# Patient Record
Sex: MALE | Race: WHITE | Employment: OTHER | URBAN - METROPOLITAN AREA
[De-identification: names, ages, dates, MRNs, and addresses within clinical notes are randomized per-mention and may not be internally consistent; named-entity substitution may affect disease eponyms.]

---

## 2017-02-06 ENCOUNTER — TELEPHONE (OUTPATIENT)
Dept: INTERNAL MEDICINE CLINIC | Age: 82
End: 2017-02-06

## 2017-02-06 RX ORDER — ALPRAZOLAM 0.5 MG/1
0.5 TABLET ORAL NIGHTLY PRN
Qty: 30 TABLET | Refills: 0 | Status: SHIPPED | OUTPATIENT
Start: 2017-02-06 | End: 2017-03-08

## 2017-03-06 ENCOUNTER — TELEPHONE (OUTPATIENT)
Dept: INTERNAL MEDICINE CLINIC | Age: 82
End: 2017-03-06

## 2017-03-06 ENCOUNTER — OFFICE VISIT (OUTPATIENT)
Dept: INTERNAL MEDICINE CLINIC | Age: 82
End: 2017-03-06

## 2017-03-06 VITALS
TEMPERATURE: 98.1 F | OXYGEN SATURATION: 97 % | HEIGHT: 69 IN | WEIGHT: 151 LBS | BODY MASS INDEX: 22.36 KG/M2 | DIASTOLIC BLOOD PRESSURE: 60 MMHG | HEART RATE: 100 BPM | SYSTOLIC BLOOD PRESSURE: 110 MMHG

## 2017-03-06 DIAGNOSIS — L30.9 DERMATITIS: ICD-10-CM

## 2017-03-06 DIAGNOSIS — I10 ESSENTIAL HYPERTENSION: Primary | ICD-10-CM

## 2017-03-06 DIAGNOSIS — F41.1 ANXIETY STATE: ICD-10-CM

## 2017-03-06 DIAGNOSIS — E11.9 TYPE 2 DIABETES MELLITUS WITHOUT COMPLICATION, WITHOUT LONG-TERM CURRENT USE OF INSULIN (HCC): ICD-10-CM

## 2017-03-06 LAB
A/G RATIO: 1.7 (ref 1.1–2.2)
ALBUMIN SERPL-MCNC: 4.3 G/DL (ref 3.4–5)
ALP BLD-CCNC: 68 U/L (ref 40–129)
ALT SERPL-CCNC: 8 U/L (ref 10–40)
ANION GAP SERPL CALCULATED.3IONS-SCNC: 16 MMOL/L (ref 3–16)
AST SERPL-CCNC: 17 U/L (ref 15–37)
BILIRUB SERPL-MCNC: 0.6 MG/DL (ref 0–1)
BUN BLDV-MCNC: 23 MG/DL (ref 7–20)
CALCIUM SERPL-MCNC: 9.7 MG/DL (ref 8.3–10.6)
CHLORIDE BLD-SCNC: 106 MMOL/L (ref 99–110)
CO2: 22 MMOL/L (ref 21–32)
CREAT SERPL-MCNC: 1.3 MG/DL (ref 0.8–1.3)
GFR AFRICAN AMERICAN: >60
GFR NON-AFRICAN AMERICAN: 52
GLOBULIN: 2.5 G/DL
GLUCOSE BLD-MCNC: 114 MG/DL (ref 70–99)
POTASSIUM SERPL-SCNC: 4.6 MMOL/L (ref 3.5–5.1)
SODIUM BLD-SCNC: 144 MMOL/L (ref 136–145)
TOTAL PROTEIN: 6.8 G/DL (ref 6.4–8.2)

## 2017-03-06 PROCEDURE — 99214 OFFICE O/P EST MOD 30 MIN: CPT | Performed by: INTERNAL MEDICINE

## 2017-03-06 RX ORDER — KETOCONAZOLE 20 MG/G
CREAM TOPICAL
Qty: 30 G | Refills: 1 | Status: SHIPPED | OUTPATIENT
Start: 2017-03-06 | End: 2017-04-20 | Stop reason: SDUPTHER

## 2017-03-06 ASSESSMENT — ENCOUNTER SYMPTOMS
RESPIRATORY NEGATIVE: 1
BACK PAIN: 1

## 2017-03-07 LAB
ESTIMATED AVERAGE GLUCOSE: 108.3 MG/DL
HBA1C MFR BLD: 5.4 %

## 2017-03-08 ENCOUNTER — TELEPHONE (OUTPATIENT)
Dept: INTERNAL MEDICINE CLINIC | Age: 82
End: 2017-03-08

## 2017-03-10 ENCOUNTER — TELEPHONE (OUTPATIENT)
Dept: INTERNAL MEDICINE CLINIC | Age: 82
End: 2017-03-10

## 2017-03-27 ENCOUNTER — TELEPHONE (OUTPATIENT)
Dept: INTERNAL MEDICINE CLINIC | Age: 82
End: 2017-03-27

## 2017-03-27 DIAGNOSIS — I48.91 ATRIAL FIBRILLATION, UNSPECIFIED TYPE (HCC): ICD-10-CM

## 2017-03-27 DIAGNOSIS — R21 RASH: Primary | ICD-10-CM

## 2017-04-12 ENCOUNTER — TELEPHONE (OUTPATIENT)
Dept: INTERNAL MEDICINE CLINIC | Age: 82
End: 2017-04-12

## 2017-04-20 ENCOUNTER — OFFICE VISIT (OUTPATIENT)
Dept: INTERNAL MEDICINE CLINIC | Age: 82
End: 2017-04-20

## 2017-04-20 VITALS
DIASTOLIC BLOOD PRESSURE: 80 MMHG | SYSTOLIC BLOOD PRESSURE: 120 MMHG | HEART RATE: 82 BPM | OXYGEN SATURATION: 97 % | HEIGHT: 69 IN | BODY MASS INDEX: 21.62 KG/M2 | WEIGHT: 146 LBS

## 2017-04-20 DIAGNOSIS — M48.061 SPINAL STENOSIS, LUMBAR REGION, WITHOUT NEUROGENIC CLAUDICATION: ICD-10-CM

## 2017-04-20 DIAGNOSIS — G25.0 BENIGN ESSENTIAL TREMOR: ICD-10-CM

## 2017-04-20 DIAGNOSIS — I25.10 ATHEROSCLEROSIS OF CORONARY ARTERY OF NATIVE HEART WITHOUT ANGINA PECTORIS, UNSPECIFIED VESSEL OR LESION TYPE: Primary | ICD-10-CM

## 2017-04-20 DIAGNOSIS — L30.9 DERMATITIS: ICD-10-CM

## 2017-04-20 DIAGNOSIS — E11.9 TYPE 2 DIABETES MELLITUS WITHOUT COMPLICATION, WITHOUT LONG-TERM CURRENT USE OF INSULIN (HCC): ICD-10-CM

## 2017-04-20 PROCEDURE — G0444 DEPRESSION SCREEN ANNUAL: HCPCS | Performed by: INTERNAL MEDICINE

## 2017-04-20 PROCEDURE — 99214 OFFICE O/P EST MOD 30 MIN: CPT | Performed by: INTERNAL MEDICINE

## 2017-04-20 RX ORDER — ALPRAZOLAM 1 MG/1
1 TABLET ORAL NIGHTLY PRN
COMMUNITY
End: 2017-04-20 | Stop reason: SDUPTHER

## 2017-04-20 RX ORDER — KETOCONAZOLE 20 MG/G
CREAM TOPICAL
Qty: 30 G | Refills: 1 | Status: SHIPPED | OUTPATIENT
Start: 2017-04-20 | End: 2017-11-16 | Stop reason: CLARIF

## 2017-04-20 RX ORDER — ALPRAZOLAM 1 MG/1
1 TABLET ORAL NIGHTLY PRN
Qty: 30 TABLET | Refills: 0 | Status: SHIPPED | OUTPATIENT
Start: 2017-04-20 | End: 2017-06-09 | Stop reason: SDUPTHER

## 2017-04-20 RX ORDER — TRAMADOL HYDROCHLORIDE 50 MG/1
50 TABLET ORAL EVERY 8 HOURS PRN
Qty: 30 TABLET | Refills: 0 | Status: SHIPPED | OUTPATIENT
Start: 2017-04-20 | End: 2017-04-30

## 2017-04-20 ASSESSMENT — ENCOUNTER SYMPTOMS
EYES NEGATIVE: 1
RESPIRATORY NEGATIVE: 1
BACK PAIN: 1

## 2017-04-20 ASSESSMENT — PATIENT HEALTH QUESTIONNAIRE - PHQ9
10. IF YOU CHECKED OFF ANY PROBLEMS, HOW DIFFICULT HAVE THESE PROBLEMS MADE IT FOR YOU TO DO YOUR WORK, TAKE CARE OF THINGS AT HOME, OR GET ALONG WITH OTHER PEOPLE: 1
7. TROUBLE CONCENTRATING ON THINGS, SUCH AS READING THE NEWSPAPER OR WATCHING TELEVISION: 0
3. TROUBLE FALLING OR STAYING ASLEEP: 0
5. POOR APPETITE OR OVEREATING: 0
4. FEELING TIRED OR HAVING LITTLE ENERGY: 3
8. MOVING OR SPEAKING SO SLOWLY THAT OTHER PEOPLE COULD HAVE NOTICED. OR THE OPPOSITE, BEING SO FIGETY OR RESTLESS THAT YOU HAVE BEEN MOVING AROUND A LOT MORE THAN USUAL: 3
9. THOUGHTS THAT YOU WOULD BE BETTER OFF DEAD, OR OF HURTING YOURSELF: 0
2. FEELING DOWN, DEPRESSED OR HOPELESS: 2
6. FEELING BAD ABOUT YOURSELF - OR THAT YOU ARE A FAILURE OR HAVE LET YOURSELF OR YOUR FAMILY DOWN: 3
1. LITTLE INTEREST OR PLEASURE IN DOING THINGS: 0
SUM OF ALL RESPONSES TO PHQ QUESTIONS 1-9: 11
SUM OF ALL RESPONSES TO PHQ9 QUESTIONS 1 & 2: 2

## 2017-05-05 ENCOUNTER — OFFICE VISIT (OUTPATIENT)
Dept: INTERNAL MEDICINE CLINIC | Age: 82
End: 2017-05-05

## 2017-05-05 VITALS
DIASTOLIC BLOOD PRESSURE: 70 MMHG | SYSTOLIC BLOOD PRESSURE: 130 MMHG | WEIGHT: 148.8 LBS | HEART RATE: 68 BPM | OXYGEN SATURATION: 98 % | HEIGHT: 69 IN | BODY MASS INDEX: 22.04 KG/M2

## 2017-05-05 DIAGNOSIS — L98.9 SKIN LESION OF LEFT ARM: Primary | ICD-10-CM

## 2017-05-05 DIAGNOSIS — E78.2 MIXED HYPERLIPIDEMIA: Chronic | ICD-10-CM

## 2017-05-05 PROCEDURE — 99213 OFFICE O/P EST LOW 20 MIN: CPT | Performed by: INTERNAL MEDICINE

## 2017-05-05 ASSESSMENT — ENCOUNTER SYMPTOMS
EYES NEGATIVE: 1
RESPIRATORY NEGATIVE: 1

## 2017-05-16 ENCOUNTER — TELEPHONE (OUTPATIENT)
Dept: INTERNAL MEDICINE CLINIC | Age: 82
End: 2017-05-16

## 2017-05-18 ENCOUNTER — OFFICE VISIT (OUTPATIENT)
Dept: SURGERY | Age: 82
End: 2017-05-18

## 2017-05-18 VITALS
WEIGHT: 148 LBS | DIASTOLIC BLOOD PRESSURE: 74 MMHG | BODY MASS INDEX: 21.92 KG/M2 | HEIGHT: 69 IN | SYSTOLIC BLOOD PRESSURE: 122 MMHG

## 2017-05-18 DIAGNOSIS — L98.9 ARM SKIN LESION, LEFT: Primary | ICD-10-CM

## 2017-05-18 PROCEDURE — 11100 PR BIOPSY OF SKIN LESION: CPT | Performed by: SURGERY

## 2017-05-18 ASSESSMENT — ENCOUNTER SYMPTOMS
COLOR CHANGE: 1
GASTROINTESTINAL NEGATIVE: 1
RESPIRATORY NEGATIVE: 1

## 2017-05-22 ENCOUNTER — TELEPHONE (OUTPATIENT)
Dept: SURGERY | Age: 82
End: 2017-05-22

## 2017-06-01 ENCOUNTER — OFFICE VISIT (OUTPATIENT)
Dept: SURGERY | Age: 82
End: 2017-06-01

## 2017-06-01 VITALS
WEIGHT: 149 LBS | DIASTOLIC BLOOD PRESSURE: 52 MMHG | SYSTOLIC BLOOD PRESSURE: 104 MMHG | BODY MASS INDEX: 22.07 KG/M2 | HEIGHT: 69 IN

## 2017-06-01 DIAGNOSIS — L98.9 ARM SKIN LESION, LEFT: Primary | ICD-10-CM

## 2017-06-01 PROCEDURE — 99024 POSTOP FOLLOW-UP VISIT: CPT | Performed by: SURGERY

## 2017-06-12 ENCOUNTER — TELEPHONE (OUTPATIENT)
Dept: INTERNAL MEDICINE CLINIC | Age: 82
End: 2017-06-12

## 2017-06-28 DIAGNOSIS — M48.061 SPINAL STENOSIS, LUMBAR REGION, WITHOUT NEUROGENIC CLAUDICATION: ICD-10-CM

## 2017-06-28 RX ORDER — ALPRAZOLAM 1 MG/1
TABLET ORAL
Qty: 30 TABLET | Refills: 0 | Status: SHIPPED | OUTPATIENT
Start: 2017-06-28 | End: 2017-08-14 | Stop reason: SDUPTHER

## 2017-08-14 DIAGNOSIS — M48.061 SPINAL STENOSIS, LUMBAR REGION, WITHOUT NEUROGENIC CLAUDICATION: ICD-10-CM

## 2017-08-14 RX ORDER — ALPRAZOLAM 1 MG/1
TABLET ORAL
Qty: 30 TABLET | Refills: 0 | Status: SHIPPED | OUTPATIENT
Start: 2017-08-14 | End: 2017-09-12 | Stop reason: SDUPTHER

## 2017-09-18 ENCOUNTER — IMMUNIZATION (OUTPATIENT)
Dept: INTERNAL MEDICINE CLINIC | Age: 82
End: 2017-09-18

## 2017-09-18 DIAGNOSIS — Z23 NEED FOR INFLUENZA VACCINATION: Primary | ICD-10-CM

## 2017-09-18 PROCEDURE — G0008 ADMIN INFLUENZA VIRUS VAC: HCPCS | Performed by: INTERNAL MEDICINE

## 2017-09-18 PROCEDURE — 90662 IIV NO PRSV INCREASED AG IM: CPT | Performed by: INTERNAL MEDICINE

## 2017-11-15 RX ORDER — POTASSIUM CHLORIDE 750 MG/1
TABLET, FILM COATED, EXTENDED RELEASE ORAL
Qty: 120 TABLET | Refills: 2 | Status: SHIPPED | OUTPATIENT
Start: 2017-11-15 | End: 2018-01-08 | Stop reason: SDUPTHER

## 2017-11-16 ENCOUNTER — OFFICE VISIT (OUTPATIENT)
Dept: INTERNAL MEDICINE CLINIC | Age: 82
End: 2017-11-16

## 2017-11-16 VITALS
BODY MASS INDEX: 22.87 KG/M2 | WEIGHT: 154.4 LBS | DIASTOLIC BLOOD PRESSURE: 82 MMHG | HEART RATE: 103 BPM | SYSTOLIC BLOOD PRESSURE: 138 MMHG | OXYGEN SATURATION: 98 % | HEIGHT: 69 IN

## 2017-11-16 DIAGNOSIS — M48.061 SPINAL STENOSIS, LUMBAR REGION, WITHOUT NEUROGENIC CLAUDICATION: ICD-10-CM

## 2017-11-16 DIAGNOSIS — G89.4 CHRONIC PAIN SYNDROME: ICD-10-CM

## 2017-11-16 DIAGNOSIS — E11.9 TYPE 2 DIABETES MELLITUS WITHOUT COMPLICATION, WITHOUT LONG-TERM CURRENT USE OF INSULIN (HCC): ICD-10-CM

## 2017-11-16 DIAGNOSIS — I10 ESSENTIAL HYPERTENSION: Primary | ICD-10-CM

## 2017-11-16 DIAGNOSIS — E78.2 MIXED HYPERLIPIDEMIA: Chronic | ICD-10-CM

## 2017-11-16 PROBLEM — L98.9 ARM SKIN LESION, LEFT: Status: RESOLVED | Noted: 2017-05-18 | Resolved: 2017-11-16

## 2017-11-16 LAB
ANION GAP SERPL CALCULATED.3IONS-SCNC: 16 MMOL/L (ref 3–16)
BUN BLDV-MCNC: 24 MG/DL (ref 7–20)
CALCIUM SERPL-MCNC: 9.9 MG/DL (ref 8.3–10.6)
CHLORIDE BLD-SCNC: 102 MMOL/L (ref 99–110)
CO2: 22 MMOL/L (ref 21–32)
CREAT SERPL-MCNC: 1.1 MG/DL (ref 0.8–1.3)
GFR AFRICAN AMERICAN: >60
GFR NON-AFRICAN AMERICAN: >60
GLUCOSE BLD-MCNC: 106 MG/DL (ref 70–99)
POTASSIUM SERPL-SCNC: 4.6 MMOL/L (ref 3.5–5.1)
SODIUM BLD-SCNC: 140 MMOL/L (ref 136–145)

## 2017-11-16 PROCEDURE — 99214 OFFICE O/P EST MOD 30 MIN: CPT | Performed by: INTERNAL MEDICINE

## 2017-11-16 RX ORDER — CLONIDINE HYDROCHLORIDE 0.1 MG/1
0.1 TABLET ORAL 2 TIMES DAILY PRN
Qty: 30 TABLET | Refills: 0 | Status: SHIPPED | OUTPATIENT
Start: 2017-11-16 | End: 2017-12-15 | Stop reason: SDUPTHER

## 2017-11-16 RX ORDER — CLONIDINE HYDROCHLORIDE 0.1 MG/1
0.1 TABLET ORAL 2 TIMES DAILY PRN
COMMUNITY
End: 2017-11-16 | Stop reason: SDUPTHER

## 2017-11-16 ASSESSMENT — ENCOUNTER SYMPTOMS
BACK PAIN: 1
RESPIRATORY NEGATIVE: 1
EYES NEGATIVE: 1

## 2017-11-16 NOTE — PROGRESS NOTES
Subjective:      Patient ID: Enedelia Cash is a 80 y.o. male. Here for a f/u   Here to review  Htn. Has  No related complaints and is compliant with  Meds. There is no dyspnea, chest pain or palpitations, edema, headache  Or dizzyness. Tolerating  medication and we review all meds and pertinant labs. Still has back pain       HPI    Review of Systems   Constitutional: Negative. HENT: Negative. Eyes: Negative. Respiratory: Negative. Cardiovascular: Negative. Endocrine: Negative. Genitourinary: Negative. Musculoskeletal: Positive for back pain. Neurological: Negative. Objective:   Physical Exam   Constitutional: He is oriented to person, place, and time. He appears well-developed and well-nourished. HENT:   Head: Normocephalic and atraumatic. Eyes: EOM are normal. Pupils are equal, round, and reactive to light. Cardiovascular: Normal rate, regular rhythm and normal heart sounds. Pulmonary/Chest: Breath sounds normal. No respiratory distress. He has no wheezes. He has no rales. Abdominal: Bowel sounds are normal.   Musculoskeletal: He exhibits no edema. Neurological: He is alert and oriented to person, place, and time. He has normal reflexes. Assessment:      Lawrence Camacho was seen today for follow-up. Diagnoses and all orders for this visit:    Essential hypertension    Use clonidine prn  BP >150/90  Chronic pain syndrome     referral  Mixed hyperlipidemia   same  Spinal stenosis, lumbar region, without neurogenic claudication        Type 2 diabetes mellitus without complication, without long-term current use of insulin ([address]     Check labs          Plan: Ming Napoles

## 2017-11-17 ENCOUNTER — TELEPHONE (OUTPATIENT)
Dept: INTERNAL MEDICINE CLINIC | Age: 82
End: 2017-11-17

## 2017-11-17 LAB
ESTIMATED AVERAGE GLUCOSE: 105.4 MG/DL
HBA1C MFR BLD: 5.3 %

## 2017-11-27 DIAGNOSIS — M48.061 SPINAL STENOSIS, LUMBAR REGION, WITHOUT NEUROGENIC CLAUDICATION: ICD-10-CM

## 2017-11-27 RX ORDER — ALPRAZOLAM 1 MG/1
TABLET ORAL
Qty: 30 TABLET | Refills: 0 | OUTPATIENT
Start: 2017-11-27

## 2017-12-15 DIAGNOSIS — I10 ESSENTIAL HYPERTENSION: ICD-10-CM

## 2017-12-15 DIAGNOSIS — M48.061 SPINAL STENOSIS, LUMBAR REGION, WITHOUT NEUROGENIC CLAUDICATION: ICD-10-CM

## 2017-12-15 RX ORDER — CLONIDINE HYDROCHLORIDE 0.1 MG/1
TABLET ORAL
Qty: 30 TABLET | Refills: 1 | Status: SHIPPED | OUTPATIENT
Start: 2017-12-15 | End: 2018-01-08 | Stop reason: SDUPTHER

## 2017-12-15 RX ORDER — ALPRAZOLAM 1 MG/1
TABLET ORAL
Qty: 30 TABLET | Refills: 1 | Status: SHIPPED | OUTPATIENT
Start: 2017-12-15 | End: 2018-01-08 | Stop reason: SDUPTHER

## 2018-01-08 DIAGNOSIS — M48.061 SPINAL STENOSIS, LUMBAR REGION, WITHOUT NEUROGENIC CLAUDICATION: ICD-10-CM

## 2018-01-08 RX ORDER — POTASSIUM CHLORIDE 750 MG/1
TABLET, FILM COATED, EXTENDED RELEASE ORAL
Qty: 120 TABLET | Refills: 2 | Status: SHIPPED | OUTPATIENT
Start: 2018-01-08 | End: 2018-01-08 | Stop reason: SDUPTHER

## 2018-01-08 RX ORDER — ALPRAZOLAM 1 MG/1
TABLET ORAL
Qty: 30 TABLET | Refills: 1 | Status: SHIPPED | OUTPATIENT
Start: 2018-01-08 | End: 2018-03-20 | Stop reason: SDUPTHER

## 2018-01-08 RX ORDER — ALPRAZOLAM 1 MG/1
TABLET ORAL
Qty: 30 TABLET | Refills: 1 | Status: SHIPPED | OUTPATIENT
Start: 2018-01-08 | End: 2018-01-08 | Stop reason: SDUPTHER

## 2018-01-08 RX ORDER — CLONIDINE HYDROCHLORIDE 0.1 MG/1
TABLET ORAL
Qty: 180 TABLET | Refills: 1 | Status: SHIPPED | OUTPATIENT
Start: 2018-01-08 | End: 2018-01-09 | Stop reason: SDUPTHER

## 2018-01-08 RX ORDER — POTASSIUM CHLORIDE 750 MG/1
TABLET, FILM COATED, EXTENDED RELEASE ORAL
Qty: 120 TABLET | Refills: 2 | Status: SHIPPED | OUTPATIENT
Start: 2018-01-08 | End: 2018-01-09 | Stop reason: SDUPTHER

## 2018-01-08 NOTE — TELEPHONE ENCOUNTER
Pt also needs a refill for Clonidine HCL 0.1 mg tab takes one tablet by mouth twice a day, pt is now using [employer]     cloNIDine (CATAPRES) 0.1 MG tablet TAKE ONE TABLET BY MOUTH TWICE A DAY AS NEEDED FOR HIGH BLOOD PRESSURE

## 2018-01-08 NOTE — TELEPHONE ENCOUNTER
Patient's wife is calling to say his medication has to go to [address] the number to call is [phone]. Lindsay Reyez . Lindsay Arroyo ...

## 2018-01-08 NOTE — TELEPHONE ENCOUNTER
ALPRAZolam (XANAX) 1 MG tablet TAKE ONE TABLET BY MOUTH EVERY NIGHT AT BEDTIME AS NEEDED FOR SLEEP     potassium chloride (KLOR-CON) 10 MEQ extended release tablet TAKE TWO TABLETS BY MOUTH TWICE A DAY     Patient has new insurance [employer] and needs his prescriptions called to [phone].

## 2018-01-09 DIAGNOSIS — I10 ESSENTIAL HYPERTENSION: ICD-10-CM

## 2018-01-09 RX ORDER — CLONIDINE HYDROCHLORIDE 0.1 MG/1
TABLET ORAL
Qty: 180 TABLET | Refills: 1 | Status: SHIPPED | OUTPATIENT
Start: 2018-01-09 | End: 2018-05-07 | Stop reason: SDUPTHER

## 2018-01-09 RX ORDER — POTASSIUM CHLORIDE 750 MG/1
TABLET, FILM COATED, EXTENDED RELEASE ORAL
Qty: 120 TABLET | Refills: 2 | Status: SHIPPED | OUTPATIENT
Start: 2018-01-09 | End: 2018-03-19 | Stop reason: SDUPTHER

## 2018-01-11 DIAGNOSIS — I10 ESSENTIAL HYPERTENSION: ICD-10-CM

## 2018-01-16 ENCOUNTER — TELEPHONE (OUTPATIENT)
Dept: INTERNAL MEDICINE CLINIC | Age: 83
End: 2018-01-16

## 2018-01-17 ENCOUNTER — TELEPHONE (OUTPATIENT)
Dept: INTERNAL MEDICINE CLINIC | Age: 83
End: 2018-01-17

## 2018-01-17 RX ORDER — ISOPROPYL ALCOHOL 0.75 G/1
SWAB TOPICAL
Qty: 100 EACH | Refills: 2 | Status: SHIPPED | OUTPATIENT
Start: 2018-01-17 | End: 2018-01-22 | Stop reason: SDUPTHER

## 2018-01-17 RX ORDER — ISOPROPYL ALCOHOL 0.75 G/1
SWAB TOPICAL
Qty: 100 EACH | Refills: 2 | Status: SHIPPED | OUTPATIENT
Start: 2018-01-17 | End: 2018-01-17 | Stop reason: SDUPTHER

## 2018-01-22 RX ORDER — ISOPROPYL ALCOHOL 0.75 G/1
SWAB TOPICAL
Qty: 180 EACH | Refills: 2 | Status: SHIPPED | OUTPATIENT
Start: 2018-01-22 | End: 2021-01-01 | Stop reason: CLARIF

## 2018-01-24 RX ORDER — TAMSULOSIN HYDROCHLORIDE 0.4 MG/1
0.4 CAPSULE ORAL DAILY
Qty: 90 CAPSULE | Refills: 3 | Status: SHIPPED | OUTPATIENT
Start: 2018-01-24 | End: 2018-01-26 | Stop reason: SDUPTHER

## 2018-01-26 RX ORDER — TAMSULOSIN HYDROCHLORIDE 0.4 MG/1
0.4 CAPSULE ORAL DAILY
Qty: 90 CAPSULE | Refills: 3 | Status: SHIPPED | OUTPATIENT
Start: 2018-01-26 | End: 2018-02-07 | Stop reason: SDUPTHER

## 2018-02-07 RX ORDER — TAMSULOSIN HYDROCHLORIDE 0.4 MG/1
0.4 CAPSULE ORAL DAILY
Qty: 90 CAPSULE | Refills: 3 | Status: SHIPPED | OUTPATIENT
Start: 2018-02-07 | End: 2018-12-17 | Stop reason: SDUPTHER

## 2018-03-20 DIAGNOSIS — M48.061 SPINAL STENOSIS, LUMBAR REGION, WITHOUT NEUROGENIC CLAUDICATION: ICD-10-CM

## 2018-03-20 RX ORDER — ALPRAZOLAM 1 MG/1
TABLET ORAL
Qty: 30 TABLET | Refills: 0 | Status: SHIPPED | OUTPATIENT
Start: 2018-03-20 | End: 2018-04-20

## 2018-03-20 RX ORDER — POTASSIUM CHLORIDE 750 MG/1
TABLET, FILM COATED, EXTENDED RELEASE ORAL
Qty: 360 TABLET | Refills: 2 | Status: ON HOLD | OUTPATIENT
Start: 2018-03-20 | End: 2019-01-13 | Stop reason: HOSPADM

## 2018-03-23 ENCOUNTER — TELEPHONE (OUTPATIENT)
Dept: INTERNAL MEDICINE CLINIC | Age: 83
End: 2018-03-23

## 2018-03-23 DIAGNOSIS — R53.1 GENERALIZED WEAKNESS: ICD-10-CM

## 2018-03-23 DIAGNOSIS — R53.83 FATIGUE, UNSPECIFIED TYPE: ICD-10-CM

## 2018-03-23 DIAGNOSIS — R29.898 RIGHT LEG WEAKNESS: Primary | ICD-10-CM

## 2018-03-26 ENCOUNTER — TELEPHONE (OUTPATIENT)
Dept: INTERNAL MEDICINE CLINIC | Age: 83
End: 2018-03-26

## 2018-03-26 DIAGNOSIS — F03.90 DEMENTIA WITHOUT BEHAVIORAL DISTURBANCE, UNSPECIFIED DEMENTIA TYPE: Primary | ICD-10-CM

## 2018-03-28 ENCOUNTER — TELEPHONE (OUTPATIENT)
Dept: INTERNAL MEDICINE CLINIC | Age: 83
End: 2018-03-28

## 2018-03-28 NOTE — TELEPHONE ENCOUNTER
Ann Hollis with [facility] calling for verbal confirmation for home Physical Therapy     3 times a week for 4 weeks for strength, transfers and gait. Patient is very weak, can't get out out of bed without help.         Call back [phone]

## 2018-04-06 ENCOUNTER — TELEPHONE (OUTPATIENT)
Dept: INTERNAL MEDICINE CLINIC | Age: 83
End: 2018-04-06

## 2018-04-06 PROBLEM — R55 SYNCOPE AND COLLAPSE: Status: ACTIVE | Noted: 2018-04-06

## 2018-04-11 ENCOUNTER — TELEPHONE (OUTPATIENT)
Dept: INTERNAL MEDICINE CLINIC | Age: 83
End: 2018-04-11

## 2018-04-25 ENCOUNTER — TELEPHONE (OUTPATIENT)
Dept: INTERNAL MEDICINE CLINIC | Age: 83
End: 2018-04-25

## 2018-05-03 ENCOUNTER — TELEPHONE (OUTPATIENT)
Dept: INTERNAL MEDICINE CLINIC | Age: 83
End: 2018-05-03
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
## 2018-05-03 DIAGNOSIS — M48.00 SPINAL STENOSIS, UNSPECIFIED SPINAL REGION: ICD-10-CM

## 2018-05-03 DIAGNOSIS — F09 OBS (ORGANIC BRAIN SYNDROME): Primary | ICD-10-CM

## 2018-05-03 DIAGNOSIS — R27.0 ATAXIA: ICD-10-CM

## 2018-05-07 DIAGNOSIS — I10 ESSENTIAL HYPERTENSION: ICD-10-CM

## 2018-05-07 RX ORDER — CLONIDINE HYDROCHLORIDE 0.1 MG/1
TABLET ORAL
Qty: 180 TABLET | Refills: 0 | Status: SHIPPED | OUTPATIENT
Start: 2018-05-07 | End: 2018-07-30 | Stop reason: SDUPTHER

## 2018-05-16 ENCOUNTER — TELEPHONE (OUTPATIENT)
Dept: INTERNAL MEDICINE CLINIC | Age: 83
End: 2018-05-16

## 2018-05-16 RX ORDER — AMLODIPINE BESYLATE 5 MG/1
5 TABLET ORAL DAILY
Qty: 90 TABLET | Refills: 0 | Status: SHIPPED | OUTPATIENT
Start: 2018-05-16 | End: 2018-09-17 | Stop reason: SDUPTHER

## 2018-06-05 RX ORDER — LANCETS
EACH MISCELLANEOUS
Qty: 200 EACH | Refills: 3 | Status: SHIPPED | OUTPATIENT
Start: 2018-06-05 | End: 2019-02-15 | Stop reason: CLARIF

## 2018-07-30 DIAGNOSIS — I10 ESSENTIAL HYPERTENSION: ICD-10-CM

## 2018-07-30 RX ORDER — CLONIDINE HYDROCHLORIDE 0.1 MG/1
TABLET ORAL
Qty: 180 TABLET | Refills: 0 | Status: SHIPPED | OUTPATIENT
Start: 2018-07-30 | End: 2018-10-01 | Stop reason: SDUPTHER

## 2018-09-18 RX ORDER — AMLODIPINE BESYLATE 5 MG/1
TABLET ORAL
Qty: 90 TABLET | Refills: 0 | Status: SHIPPED | OUTPATIENT
Start: 2018-09-18 | End: 2018-11-20 | Stop reason: SDUPTHER

## 2018-10-01 DIAGNOSIS — I10 ESSENTIAL HYPERTENSION: ICD-10-CM

## 2018-10-02 RX ORDER — CLONIDINE HYDROCHLORIDE 0.1 MG/1
TABLET ORAL
Qty: 180 TABLET | Refills: 0 | Status: SHIPPED | OUTPATIENT
Start: 2018-10-02 | End: 2018-12-05 | Stop reason: SDUPTHER

## 2018-10-16 RX ORDER — BLOOD SUGAR DIAGNOSTIC
STRIP MISCELLANEOUS
Qty: 100 STRIP | Refills: 3 | Status: SHIPPED | OUTPATIENT
Start: 2018-10-16 | End: 2019-02-15 | Stop reason: CLARIF

## 2018-12-05 DIAGNOSIS — I10 ESSENTIAL HYPERTENSION: ICD-10-CM

## 2018-12-05 RX ORDER — CLONIDINE HYDROCHLORIDE 0.1 MG/1
TABLET ORAL
Qty: 180 TABLET | Refills: 0 | Status: ON HOLD | OUTPATIENT
Start: 2018-12-05 | End: 2019-01-13 | Stop reason: HOSPADM

## 2018-12-18 RX ORDER — TAMSULOSIN HYDROCHLORIDE 0.4 MG/1
CAPSULE ORAL
Qty: 90 CAPSULE | Refills: 0 | Status: ON HOLD | OUTPATIENT
Start: 2018-12-18 | End: 2019-01-13 | Stop reason: HOSPADM

## 2019-01-11 ENCOUNTER — APPOINTMENT (OUTPATIENT)
Dept: CT IMAGING | Age: 84
DRG: 312 | End: 2019-01-11
Payer: MEDICARE

## 2019-01-11 ENCOUNTER — HOSPITAL ENCOUNTER (INPATIENT)
Age: 84
LOS: 2 days | Discharge: HOME HEALTH | DRG: 312 | End: 2019-01-13
Attending: EMERGENCY MEDICINE | Admitting: INTERNAL MEDICINE
Payer: MEDICARE

## 2019-01-11 ENCOUNTER — APPOINTMENT (OUTPATIENT)
Dept: GENERAL RADIOLOGY | Age: 84
DRG: 312 | End: 2019-01-11
Payer: MEDICARE

## 2019-01-11 DIAGNOSIS — R53.1 GENERAL WEAKNESS: ICD-10-CM

## 2019-01-11 DIAGNOSIS — R53.83 FATIGUE, UNSPECIFIED TYPE: ICD-10-CM
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
## 2019-01-11 DIAGNOSIS — R00.1 SYMPTOMATIC BRADYCARDIA: Primary | ICD-10-CM

## 2019-01-11 LAB
A/G RATIO: 1.9 (ref 1.1–2.2)
ALBUMIN SERPL-MCNC: 4.1 G/DL (ref 3.4–5)
ALP BLD-CCNC: 67 U/L (ref 40–129)
ALT SERPL-CCNC: 9 U/L (ref 10–40)
ANION GAP SERPL CALCULATED.3IONS-SCNC: 12 MMOL/L (ref 3–16)
AST SERPL-CCNC: 15 U/L (ref 15–37)
BASOPHILS ABSOLUTE: 0 K/UL (ref 0–0.2)
BASOPHILS RELATIVE PERCENT: 0.3 %
BILIRUB SERPL-MCNC: 0.5 MG/DL (ref 0–1)
BILIRUBIN URINE: NEGATIVE
BLOOD, URINE: NEGATIVE
BUN BLDV-MCNC: 21 MG/DL (ref 7–20)
CALCIUM SERPL-MCNC: 9.6 MG/DL (ref 8.3–10.6)
CHLORIDE BLD-SCNC: 103 MMOL/L (ref 99–110)
CLARITY: CLEAR
CO2: 24 MMOL/L (ref 21–32)
COLOR: YELLOW
CREAT SERPL-MCNC: 1.1 MG/DL (ref 0.8–1.3)
EKG ATRIAL RATE: 51 BPM
EKG DIAGNOSIS: NORMAL
EKG P AXIS: 86 DEGREES
EKG P-R INTERVAL: 268 MS
EKG Q-T INTERVAL: 428 MS
EKG QRS DURATION: 80 MS
EKG QTC CALCULATION (BAZETT): 394 MS
EKG R AXIS: 12 DEGREES
EKG T AXIS: 76 DEGREES
EKG VENTRICULAR RATE: 51 BPM
EOSINOPHILS ABSOLUTE: 0.1 K/UL (ref 0–0.6)
EOSINOPHILS RELATIVE PERCENT: 1.6 %
GFR AFRICAN AMERICAN: >60
GFR NON-AFRICAN AMERICAN: >60
GLOBULIN: 2.2 G/DL
GLUCOSE BLD-MCNC: 103 MG/DL (ref 70–99)
GLUCOSE BLD-MCNC: 153 MG/DL (ref 70–99)
GLUCOSE BLD-MCNC: 163 MG/DL (ref 70–99)
GLUCOSE URINE: NEGATIVE MG/DL
HCT VFR BLD CALC: 38.8 % (ref 40.5–52.5)
HEMOGLOBIN: 13.3 G/DL (ref 13.5–17.5)
KETONES, URINE: NEGATIVE MG/DL
LACTIC ACID: 1.3 MMOL/L (ref 0.4–2)
LACTIC ACID: 2.1 MMOL/L (ref 0.4–2)
LEUKOCYTE ESTERASE, URINE: NEGATIVE
LYMPHOCYTES ABSOLUTE: 0.9 K/UL (ref 1–5.1)
LYMPHOCYTES RELATIVE PERCENT: 16.5 %
MCH RBC QN AUTO: 32.9 PG (ref 26–34)
MCHC RBC AUTO-ENTMCNC: 34.4 G/DL (ref 31–36)
MCV RBC AUTO: 95.7 FL (ref 80–100)
MICROSCOPIC EXAMINATION: NORMAL
MONOCYTES ABSOLUTE: 0.2 K/UL (ref 0–1.3)
MONOCYTES RELATIVE PERCENT: 3.4 %
NEUTROPHILS ABSOLUTE: 4.4 K/UL (ref 1.7–7.7)
NEUTROPHILS RELATIVE PERCENT: 78.2 %
NITRITE, URINE: NEGATIVE
PDW BLD-RTO: 13.5 % (ref 12.4–15.4)
PERFORMED ON: ABNORMAL
PERFORMED ON: ABNORMAL
PH UA: 6.5
PLATELET # BLD: 124 K/UL (ref 135–450)
PMV BLD AUTO: 8.3 FL (ref 5–10.5)
POTASSIUM SERPL-SCNC: 4.7 MMOL/L (ref 3.5–5.1)
PRO-BNP: 865 PG/ML (ref 0–449)
PROTEIN UA: NEGATIVE MG/DL
RBC # BLD: 4.06 M/UL (ref 4.2–5.9)
SODIUM BLD-SCNC: 139 MMOL/L (ref 136–145)
SPECIFIC GRAVITY UA: 1.01
TOTAL CK: 49 U/L (ref 39–308)
TOTAL PROTEIN: 6.3 G/DL (ref 6.4–8.2)
TROPONIN: <0.01 NG/ML
TROPONIN: <0.01 NG/ML
URINE REFLEX TO CULTURE: NORMAL
URINE TYPE: NORMAL
UROBILINOGEN, URINE: 1 E.U./DL
WBC # BLD: 5.6 K/UL (ref 4–11)

## 2019-01-11 PROCEDURE — 94760 N-INVAS EAR/PLS OXIMETRY 1: CPT

## 2019-01-11 PROCEDURE — G8978 MOBILITY CURRENT STATUS: HCPCS

## 2019-01-11 PROCEDURE — 97530 THERAPEUTIC ACTIVITIES: CPT

## 2019-01-11 PROCEDURE — G8988 SELF CARE GOAL STATUS: HCPCS

## 2019-01-11 PROCEDURE — 83605 ASSAY OF LACTIC ACID: CPT

## 2019-01-11 PROCEDURE — 94664 DEMO&/EVAL PT USE INHALER: CPT

## 2019-01-11 PROCEDURE — 93005 ELECTROCARDIOGRAM TRACING: CPT | Performed by: PHYSICIAN ASSISTANT

## 2019-01-11 PROCEDURE — G8987 SELF CARE CURRENT STATUS: HCPCS

## 2019-01-11 PROCEDURE — 93005 ELECTROCARDIOGRAM TRACING: CPT | Performed by: NURSE PRACTITIONER

## 2019-01-11 PROCEDURE — 96360 HYDRATION IV INFUSION INIT: CPT

## 2019-01-11 PROCEDURE — G0378 HOSPITAL OBSERVATION PER HR: HCPCS

## 2019-01-11 PROCEDURE — 84484 ASSAY OF TROPONIN QUANT: CPT

## 2019-01-11 PROCEDURE — 99285 EMERGENCY DEPT VISIT HI MDM: CPT

## 2019-01-11 PROCEDURE — 36415 COLL VENOUS BLD VENIPUNCTURE: CPT

## 2019-01-11 PROCEDURE — 80053 COMPREHEN METABOLIC PANEL: CPT

## 2019-01-11 PROCEDURE — 93010 ELECTROCARDIOGRAM REPORT: CPT | Performed by: INTERNAL MEDICINE

## 2019-01-11 PROCEDURE — 6370000000 HC RX 637 (ALT 250 FOR IP): Performed by: NURSE PRACTITIONER

## 2019-01-11 PROCEDURE — 96361 HYDRATE IV INFUSION ADD-ON: CPT

## 2019-01-11 PROCEDURE — 2580000003 HC RX 258: Performed by: NURSE PRACTITIONER

## 2019-01-11 PROCEDURE — 87040 BLOOD CULTURE FOR BACTERIA: CPT

## 2019-01-11 PROCEDURE — 97166 OT EVAL MOD COMPLEX 45 MIN: CPT

## 2019-01-11 PROCEDURE — 97162 PT EVAL MOD COMPLEX 30 MIN: CPT

## 2019-01-11 PROCEDURE — G8979 MOBILITY GOAL STATUS: HCPCS

## 2019-01-11 PROCEDURE — 1200000000 HC SEMI PRIVATE

## 2019-01-11 PROCEDURE — 71046 X-RAY EXAM CHEST 2 VIEWS: CPT

## 2019-01-11 PROCEDURE — 82550 ASSAY OF CK (CPK): CPT

## 2019-01-11 PROCEDURE — 83880 ASSAY OF NATRIURETIC PEPTIDE: CPT

## 2019-01-11 PROCEDURE — 6360000002 HC RX W HCPCS: Performed by: NURSE PRACTITIONER

## 2019-01-11 PROCEDURE — 81003 URINALYSIS AUTO W/O SCOPE: CPT

## 2019-01-11 PROCEDURE — 85025 COMPLETE CBC W/AUTO DIFF WBC: CPT

## 2019-01-11 PROCEDURE — 70450 CT HEAD/BRAIN W/O DYE: CPT

## 2019-01-11 PROCEDURE — 2580000003 HC RX 258: Performed by: PHYSICIAN ASSISTANT

## 2019-01-11 PROCEDURE — 83036 HEMOGLOBIN GLYCOSYLATED A1C: CPT

## 2019-01-11 RX ORDER — ASPIRIN 81 MG/1
81 TABLET, CHEWABLE ORAL DAILY
Status: DISCONTINUED | OUTPATIENT
Start: 2019-01-11 | End: 2019-01-13 | Stop reason: HOSPADM

## 2019-01-11 RX ORDER — 0.9 % SODIUM CHLORIDE 0.9 %
500 INTRAVENOUS SOLUTION INTRAVENOUS ONCE
Status: COMPLETED | OUTPATIENT
Start: 2019-01-11 | End: 2019-01-11

## 2019-01-11 RX ORDER — AMLODIPINE BESYLATE 5 MG/1
5 TABLET ORAL DAILY
Status: DISCONTINUED | OUTPATIENT
Start: 2019-01-11 | End: 2019-01-11

## 2019-01-11 RX ORDER — SODIUM CHLORIDE 0.9 % (FLUSH) 0.9 %
10 SYRINGE (ML) INJECTION EVERY 12 HOURS SCHEDULED
Status: DISCONTINUED | OUTPATIENT
Start: 2019-01-11 | End: 2019-01-13 | Stop reason: HOSPADM

## 2019-01-11 RX ORDER — SODIUM CHLORIDE 0.9 % (FLUSH) 0.9 %
10 SYRINGE (ML) INJECTION PRN
Status: DISCONTINUED | OUTPATIENT
Start: 2019-01-11 | End: 2019-01-13 | Stop reason: HOSPADM

## 2019-01-11 RX ORDER — ACETAMINOPHEN 325 MG/1
650 TABLET ORAL EVERY 4 HOURS PRN
Status: DISCONTINUED | OUTPATIENT
Start: 2019-01-11 | End: 2019-01-13 | Stop reason: HOSPADM

## 2019-01-11 RX ORDER — NICOTINE POLACRILEX 4 MG
15 LOZENGE BUCCAL PRN
Status: DISCONTINUED | OUTPATIENT
Start: 2019-01-11 | End: 2019-01-13 | Stop reason: HOSPADM

## 2019-01-11 RX ORDER — ONDANSETRON 2 MG/ML
4 INJECTION INTRAMUSCULAR; INTRAVENOUS EVERY 6 HOURS PRN
Status: DISCONTINUED | OUTPATIENT
Start: 2019-01-11 | End: 2019-01-13 | Stop reason: HOSPADM

## 2019-01-11 RX ORDER — DEXTROSE MONOHYDRATE 25 G/50ML
12.5 INJECTION, SOLUTION INTRAVENOUS PRN
Status: DISCONTINUED | OUTPATIENT
Start: 2019-01-11 | End: 2019-01-13 | Stop reason: HOSPADM

## 2019-01-11 RX ORDER — DEXTROSE MONOHYDRATE 50 MG/ML
100 INJECTION, SOLUTION INTRAVENOUS PRN
Status: DISCONTINUED | OUTPATIENT
Start: 2019-01-11 | End: 2019-01-13 | Stop reason: HOSPADM

## 2019-01-11 RX ORDER — SODIUM CHLORIDE 9 MG/ML
INJECTION, SOLUTION INTRAVENOUS CONTINUOUS
Status: DISCONTINUED | OUTPATIENT
Start: 2019-01-11 | End: 2019-01-13 | Stop reason: HOSPADM

## 2019-01-11 RX ADMIN — SODIUM CHLORIDE: 9 INJECTION, SOLUTION INTRAVENOUS at 11:08

## 2019-01-11 RX ADMIN — Medication 10 ML: at 21:48

## 2019-01-11 RX ADMIN — SODIUM CHLORIDE: 9 INJECTION, SOLUTION INTRAVENOUS at 21:49

## 2019-01-11 RX ADMIN — ENOXAPARIN SODIUM 40 MG: 40 INJECTION SUBCUTANEOUS at 18:47

## 2019-01-11 RX ADMIN — SODIUM CHLORIDE 500 ML: 9 INJECTION, SOLUTION INTRAVENOUS at 06:57

## 2019-01-11 RX ADMIN — ASPIRIN 81 MG 81 MG: 81 TABLET ORAL at 18:46

## 2019-01-11 ASSESSMENT — ENCOUNTER SYMPTOMS
SHORTNESS OF BREATH: 0
ABDOMINAL PAIN: 0
NAUSEA: 1
BACK PAIN: 1
VOMITING: 0

## 2019-01-11 ASSESSMENT — PAIN SCALES - GENERAL: PAINLEVEL_OUTOF10: 0

## 2019-01-12 LAB
A/G RATIO: 1.6 (ref 1.1–2.2)
ALBUMIN SERPL-MCNC: 3.6 G/DL (ref 3.4–5)
ALP BLD-CCNC: 61 U/L (ref 40–129)
ALT SERPL-CCNC: 8 U/L (ref 10–40)
ANION GAP SERPL CALCULATED.3IONS-SCNC: 8 MMOL/L (ref 3–16)
AST SERPL-CCNC: 17 U/L (ref 15–37)
BASOPHILS ABSOLUTE: 0 K/UL (ref 0–0.2)
BASOPHILS RELATIVE PERCENT: 0.2 %
BILIRUB SERPL-MCNC: 0.5 MG/DL (ref 0–1)
BUN BLDV-MCNC: 19 MG/DL (ref 7–20)
CALCIUM SERPL-MCNC: 9.5 MG/DL (ref 8.3–10.6)
CHLORIDE BLD-SCNC: 110 MMOL/L (ref 99–110)
CO2: 26 MMOL/L (ref 21–32)
CREAT SERPL-MCNC: 1.1 MG/DL (ref 0.8–1.3)
EOSINOPHILS ABSOLUTE: 0.1 K/UL (ref 0–0.6)
EOSINOPHILS RELATIVE PERCENT: 1.2 %
ESTIMATED AVERAGE GLUCOSE: 114 MG/DL
GFR AFRICAN AMERICAN: >60
GFR NON-AFRICAN AMERICAN: >60
GLOBULIN: 2.2 G/DL
GLUCOSE BLD-MCNC: 101 MG/DL (ref 70–99)
GLUCOSE BLD-MCNC: 111 MG/DL (ref 70–99)
GLUCOSE BLD-MCNC: 112 MG/DL (ref 70–99)
GLUCOSE BLD-MCNC: 164 MG/DL (ref 70–99)
GLUCOSE BLD-MCNC: 90 MG/DL (ref 70–99)
HBA1C MFR BLD: 5.6 %
HCT VFR BLD CALC: 35.9 % (ref 40.5–52.5)
HEMOGLOBIN: 12.4 G/DL (ref 13.5–17.5)
LYMPHOCYTES ABSOLUTE: 0.8 K/UL (ref 1–5.1)
LYMPHOCYTES RELATIVE PERCENT: 17.3 %
MCH RBC QN AUTO: 33.1 PG (ref 26–34)
MCHC RBC AUTO-ENTMCNC: 34.4 G/DL (ref 31–36)
MCV RBC AUTO: 96.2 FL (ref 80–100)
MONOCYTES ABSOLUTE: 0.2 K/UL (ref 0–1.3)
MONOCYTES RELATIVE PERCENT: 5.1 %
NEUTROPHILS ABSOLUTE: 3.7 K/UL (ref 1.7–7.7)
NEUTROPHILS RELATIVE PERCENT: 76.2 %
PDW BLD-RTO: 13.7 % (ref 12.4–15.4)
PERFORMED ON: ABNORMAL
PERFORMED ON: NORMAL
PLATELET # BLD: 102 K/UL (ref 135–450)
PMV BLD AUTO: 8.3 FL (ref 5–10.5)
POTASSIUM SERPL-SCNC: 4.7 MMOL/L (ref 3.5–5.1)
RBC # BLD: 3.74 M/UL (ref 4.2–5.9)
SODIUM BLD-SCNC: 144 MMOL/L (ref 136–145)
TOTAL PROTEIN: 5.8 G/DL (ref 6.4–8.2)
TROPONIN: <0.01 NG/ML
WBC # BLD: 4.9 K/UL (ref 4–11)

## 2019-01-12 PROCEDURE — 80053 COMPREHEN METABOLIC PANEL: CPT

## 2019-01-12 PROCEDURE — 85025 COMPLETE CBC W/AUTO DIFF WBC: CPT

## 2019-01-12 PROCEDURE — 94760 N-INVAS EAR/PLS OXIMETRY 1: CPT

## 2019-01-12 PROCEDURE — 6360000002 HC RX W HCPCS: Performed by: NURSE PRACTITIONER

## 2019-01-12 PROCEDURE — 84484 ASSAY OF TROPONIN QUANT: CPT

## 2019-01-12 PROCEDURE — 6370000000 HC RX 637 (ALT 250 FOR IP): Performed by: NURSE PRACTITIONER

## 2019-01-12 PROCEDURE — 2580000003 HC RX 258: Performed by: NURSE PRACTITIONER

## 2019-01-12 PROCEDURE — 1200000000 HC SEMI PRIVATE

## 2019-01-12 PROCEDURE — 36415 COLL VENOUS BLD VENIPUNCTURE: CPT

## 2019-01-12 RX ORDER — 0.9 % SODIUM CHLORIDE 0.9 %
500 INTRAVENOUS SOLUTION INTRAVENOUS ONCE
Status: COMPLETED | OUTPATIENT
Start: 2019-01-12 | End: 2019-01-12

## 2019-01-12 RX ORDER — CALCIUM CARBONATE 500(1250)
500 TABLET ORAL DAILY
COMMUNITY
End: 2021-01-01 | Stop reason: CLARIF

## 2019-01-12 RX ADMIN — ENOXAPARIN SODIUM 40 MG: 40 INJECTION SUBCUTANEOUS at 08:39

## 2019-01-12 RX ADMIN — ASPIRIN 81 MG 81 MG: 81 TABLET ORAL at 08:39

## 2019-01-12 RX ADMIN — SODIUM CHLORIDE 500 ML: 9 INJECTION, SOLUTION INTRAVENOUS at 16:42

## 2019-01-12 RX ADMIN — SODIUM CHLORIDE: 9 INJECTION, SOLUTION INTRAVENOUS at 07:07

## 2019-01-12 ASSESSMENT — PAIN SCALES - GENERAL: PAINLEVEL_OUTOF10: 0

## 2019-01-13 VITALS
HEART RATE: 70 BPM | OXYGEN SATURATION: 96 % | TEMPERATURE: 97.7 F | RESPIRATION RATE: 16 BRPM | DIASTOLIC BLOOD PRESSURE: 69 MMHG | SYSTOLIC BLOOD PRESSURE: 165 MMHG | HEIGHT: 69 IN | WEIGHT: 158.07 LBS | BODY MASS INDEX: 23.41 KG/M2

## 2019-01-13 LAB
A/G RATIO: 1.8 (ref 1.1–2.2)
ALBUMIN SERPL-MCNC: 4.1 G/DL (ref 3.4–5)
ALP BLD-CCNC: 70 U/L (ref 40–129)
ALT SERPL-CCNC: 10 U/L (ref 10–40)
ANION GAP SERPL CALCULATED.3IONS-SCNC: 13 MMOL/L (ref 3–16)
AST SERPL-CCNC: 23 U/L (ref 15–37)
BASOPHILS ABSOLUTE: 0 K/UL (ref 0–0.2)
BASOPHILS RELATIVE PERCENT: 0.2 %
BILIRUB SERPL-MCNC: 0.5 MG/DL (ref 0–1)
BUN BLDV-MCNC: 18 MG/DL (ref 7–20)
CALCIUM SERPL-MCNC: 9 MG/DL (ref 8.3–10.6)
CHLORIDE BLD-SCNC: 109 MMOL/L (ref 99–110)
CO2: 21 MMOL/L (ref 21–32)
CREAT SERPL-MCNC: 1.1 MG/DL (ref 0.8–1.3)
EKG ATRIAL RATE: 34 BPM
EKG DIAGNOSIS: NORMAL
EKG P AXIS: 44 DEGREES
EKG P-R INTERVAL: 242 MS
EKG Q-T INTERVAL: 506 MS
EKG QRS DURATION: 92 MS
EKG QTC CALCULATION (BAZETT): 380 MS
EKG R AXIS: 12 DEGREES
EKG T AXIS: 16 DEGREES
EKG VENTRICULAR RATE: 34 BPM
EOSINOPHILS ABSOLUTE: 0 K/UL (ref 0–0.6)
EOSINOPHILS RELATIVE PERCENT: 0.1 %
GFR AFRICAN AMERICAN: >60
GFR NON-AFRICAN AMERICAN: >60
GLOBULIN: 2.3 G/DL
GLUCOSE BLD-MCNC: 109 MG/DL (ref 70–99)
GLUCOSE BLD-MCNC: 109 MG/DL (ref 70–99)
GLUCOSE BLD-MCNC: 97 MG/DL (ref 70–99)
HCT VFR BLD CALC: 37.6 % (ref 40.5–52.5)
HEMOGLOBIN: 12.7 G/DL (ref 13.5–17.5)
LYMPHOCYTES ABSOLUTE: 0.8 K/UL (ref 1–5.1)
LYMPHOCYTES RELATIVE PERCENT: 12.8 %
MCH RBC QN AUTO: 32.2 PG (ref 26–34)
MCHC RBC AUTO-ENTMCNC: 33.8 G/DL (ref 31–36)
MCV RBC AUTO: 95.1 FL (ref 80–100)
MONOCYTES ABSOLUTE: 0.3 K/UL (ref 0–1.3)
MONOCYTES RELATIVE PERCENT: 4.2 %
NEUTROPHILS ABSOLUTE: 5.2 K/UL (ref 1.7–7.7)
NEUTROPHILS RELATIVE PERCENT: 82.7 %
PDW BLD-RTO: 14 % (ref 12.4–15.4)
PERFORMED ON: ABNORMAL
PERFORMED ON: NORMAL
PLATELET # BLD: 103 K/UL (ref 135–450)
PMV BLD AUTO: 8.4 FL (ref 5–10.5)
POTASSIUM SERPL-SCNC: 3.7 MMOL/L (ref 3.5–5.1)
RBC # BLD: 3.95 M/UL (ref 4.2–5.9)
SODIUM BLD-SCNC: 143 MMOL/L (ref 136–145)
TOTAL PROTEIN: 6.4 G/DL (ref 6.4–8.2)
WBC # BLD: 6.3 K/UL (ref 4–11)

## 2019-01-13 PROCEDURE — 2580000003 HC RX 258: Performed by: NURSE PRACTITIONER

## 2019-01-13 PROCEDURE — 6360000002 HC RX W HCPCS: Performed by: NURSE PRACTITIONER

## 2019-01-13 PROCEDURE — 6370000000 HC RX 637 (ALT 250 FOR IP): Performed by: NURSE PRACTITIONER

## 2019-01-13 PROCEDURE — 36415 COLL VENOUS BLD VENIPUNCTURE: CPT

## 2019-01-13 PROCEDURE — 94760 N-INVAS EAR/PLS OXIMETRY 1: CPT

## 2019-01-13 PROCEDURE — 80053 COMPREHEN METABOLIC PANEL: CPT

## 2019-01-13 PROCEDURE — 85025 COMPLETE CBC W/AUTO DIFF WBC: CPT

## 2019-01-13 RX ADMIN — ENOXAPARIN SODIUM 40 MG: 40 INJECTION SUBCUTANEOUS at 08:42

## 2019-01-13 RX ADMIN — ASPIRIN 81 MG 81 MG: 81 TABLET ORAL at 08:42

## 2019-01-13 RX ADMIN — ACETAMINOPHEN 650 MG: 325 TABLET, FILM COATED ORAL at 02:58

## 2019-01-13 RX ADMIN — Medication 10 ML: at 08:43

## 2019-01-13 RX ADMIN — SODIUM CHLORIDE: 9 INJECTION, SOLUTION INTRAVENOUS at 00:46

## 2019-01-13 ASSESSMENT — PAIN SCALES - GENERAL
PAINLEVEL_OUTOF10: 0
PAINLEVEL_OUTOF10: 0

## 2019-01-16 LAB
BLOOD CULTURE, ROUTINE: NORMAL
CULTURE, BLOOD 2: NORMAL

## 2019-02-15 DIAGNOSIS — F03.90 DEMENTIA WITHOUT BEHAVIORAL DISTURBANCE, UNSPECIFIED DEMENTIA TYPE: ICD-10-CM

## 2019-06-24 RX ORDER — BLOOD SUGAR DIAGNOSTIC
STRIP MISCELLANEOUS
Qty: 100 STRIP | Refills: 3 | Status: SHIPPED | OUTPATIENT
Start: 2019-06-24 | End: 2020-03-11 | Stop reason: SDUPTHER

## 2019-09-19 ENCOUNTER — INITIAL CONSULT (OUTPATIENT)
Dept: SURGERY | Age: 84
End: 2019-09-19
Payer: MEDICARE

## 2019-09-19 VITALS
DIASTOLIC BLOOD PRESSURE: 80 MMHG | WEIGHT: 154 LBS | BODY MASS INDEX: 22.81 KG/M2 | SYSTOLIC BLOOD PRESSURE: 130 MMHG | HEIGHT: 69 IN

## 2019-09-19 DIAGNOSIS — L98.9 ARM SKIN LESION, LEFT: Primary | ICD-10-CM

## 2019-09-19 PROCEDURE — G8420 CALC BMI NORM PARAMETERS: HCPCS | Performed by: SURGERY

## 2019-09-19 PROCEDURE — G8427 DOCREV CUR MEDS BY ELIG CLIN: HCPCS | Performed by: SURGERY

## 2019-09-19 PROCEDURE — G8598 ASA/ANTIPLAT THER USED: HCPCS | Performed by: SURGERY

## 2019-09-19 PROCEDURE — 99213 OFFICE O/P EST LOW 20 MIN: CPT | Performed by: SURGERY

## 2019-09-19 PROCEDURE — 11104 PUNCH BX SKIN SINGLE LESION: CPT | Performed by: SURGERY

## 2019-09-19 PROCEDURE — 1123F ACP DISCUSS/DSCN MKR DOCD: CPT | Performed by: SURGERY

## 2019-09-19 PROCEDURE — 4040F PNEUMOC VAC/ADMIN/RCVD: CPT | Performed by: SURGERY

## 2019-09-19 PROCEDURE — 1036F TOBACCO NON-USER: CPT | Performed by: SURGERY

## 2019-09-19 ASSESSMENT — ENCOUNTER SYMPTOMS: COLOR CHANGE: 1

## 2019-09-25 ENCOUNTER — TELEPHONE (OUTPATIENT)
Dept: SURGERY | Age: 84
End: 2019-09-25

## 2019-09-26 RX ORDER — SODIUM CHLORIDE 0.9 % (FLUSH) 0.9 %
10 SYRINGE (ML) INJECTION PRN
Status: CANCELLED | OUTPATIENT
Start: 2019-09-26

## 2019-09-26 RX ORDER — SODIUM CHLORIDE 0.9 % (FLUSH) 0.9 %
10 SYRINGE (ML) INJECTION EVERY 12 HOURS SCHEDULED
Status: CANCELLED | OUTPATIENT
Start: 2019-09-26

## 2019-09-26 RX ORDER — SODIUM CHLORIDE 9 MG/ML
INJECTION, SOLUTION INTRAVENOUS CONTINUOUS
Status: CANCELLED | OUTPATIENT
Start: 2019-09-26

## 2019-09-27 ENCOUNTER — HOSPITAL ENCOUNTER (OUTPATIENT)
Age: 84
Setting detail: OUTPATIENT SURGERY
Discharge: HOME | End: 2019-09-27
Attending: SURGERY | Admitting: SURGERY
Payer: MEDICARE

## 2019-09-27 VITALS
HEIGHT: 69 IN | OXYGEN SATURATION: 97 % | SYSTOLIC BLOOD PRESSURE: 121 MMHG | HEART RATE: 69 BPM | WEIGHT: 150 LBS | BODY MASS INDEX: 22.22 KG/M2 | TEMPERATURE: 96.9 F | RESPIRATION RATE: 16 BRPM | DIASTOLIC BLOOD PRESSURE: 44 MMHG

## 2019-09-27 PROBLEM — C44.629 SQUAMOUS CELL CARCINOMA OF SKIN OF LEFT UPPER ARM: Status: ACTIVE | Noted: 2019-09-27

## 2019-09-27 PROCEDURE — 3600000002 HC SURGERY LEVEL 2 BASE: Performed by: SURGERY

## 2019-09-27 PROCEDURE — 2709999900 HC NON-CHARGEABLE SUPPLY: Performed by: SURGERY

## 2019-09-27 PROCEDURE — 7100000010 HC PHASE II RECOVERY - FIRST 15 MIN: Performed by: SURGERY

## 2019-09-27 PROCEDURE — 2580000003 HC RX 258: Performed by: SURGERY

## 2019-09-27 PROCEDURE — 11603 EXC TR-EXT MAL+MARG 2.1-3 CM: CPT | Performed by: SURGERY

## 2019-09-27 PROCEDURE — 3600000012 HC SURGERY LEVEL 2 ADDTL 15MIN: Performed by: SURGERY

## 2019-09-27 PROCEDURE — 7100000011 HC PHASE II RECOVERY - ADDTL 15 MIN: Performed by: SURGERY

## 2019-09-27 PROCEDURE — 2500000003 HC RX 250 WO HCPCS: Performed by: SURGERY

## 2019-09-27 PROCEDURE — 88305 TISSUE EXAM BY PATHOLOGIST: CPT

## 2019-09-27 RX ORDER — MAGNESIUM HYDROXIDE 1200 MG/15ML
LIQUID ORAL CONTINUOUS PRN
Status: COMPLETED | OUTPATIENT
Start: 2019-09-27 | End: 2019-09-27

## 2019-09-27 RX ORDER — LIDOCAINE HYDROCHLORIDE AND EPINEPHRINE 10; 10 MG/ML; UG/ML
INJECTION, SOLUTION INFILTRATION; PERINEURAL
Status: COMPLETED | OUTPATIENT
Start: 2019-09-27 | End: 2019-09-27

## 2019-09-27 ASSESSMENT — PAIN - FUNCTIONAL ASSESSMENT: PAIN_FUNCTIONAL_ASSESSMENT: 0-10

## 2019-09-27 ASSESSMENT — PAIN SCALES - GENERAL
PAINLEVEL_OUTOF10: 0

## 2019-10-10 ENCOUNTER — OFFICE VISIT (OUTPATIENT)
Dept: SURGERY | Age: 84
End: 2019-10-10

## 2019-10-10 VITALS — DIASTOLIC BLOOD PRESSURE: 56 MMHG | SYSTOLIC BLOOD PRESSURE: 110 MMHG

## 2019-10-10 DIAGNOSIS — L98.9 ARM SKIN LESION, LEFT: Primary | ICD-10-CM

## 2019-10-10 PROCEDURE — 99024 POSTOP FOLLOW-UP VISIT: CPT | Performed by: SURGERY

## 2020-01-27 ENCOUNTER — HOSPITAL ENCOUNTER (EMERGENCY)
Age: 85
Discharge: HOME | End: 2020-01-28
Payer: MEDICARE

## 2020-01-27 ENCOUNTER — APPOINTMENT (OUTPATIENT)
Dept: CT IMAGING | Age: 85
End: 2020-01-27
Payer: MEDICARE

## 2020-01-27 PROCEDURE — 99285 EMERGENCY DEPT VISIT HI MDM: CPT

## 2020-01-27 PROCEDURE — 70450 CT HEAD/BRAIN W/O DYE: CPT

## 2020-01-28 VITALS
SYSTOLIC BLOOD PRESSURE: 121 MMHG | BODY MASS INDEX: 22.71 KG/M2 | HEART RATE: 55 BPM | WEIGHT: 153.31 LBS | OXYGEN SATURATION: 99 % | HEIGHT: 69 IN | RESPIRATION RATE: 18 BRPM | DIASTOLIC BLOOD PRESSURE: 50 MMHG | TEMPERATURE: 97.6 F

## 2020-01-28 ASSESSMENT — ENCOUNTER SYMPTOMS
VOMITING: 0
DIARRHEA: 0

## 2020-01-28 NOTE — ED PROVIDER NOTES
[duloxetine hcl]; Metoprolol; Tramadol; and Amoxicillin    FAMILY HISTORY           Problem Relation Age of Onset    Other Mother         cva    Cancer Father         lung     Family Status   Relation Name Status    Mother      Father          SOCIAL HISTORY      reports that he quit smoking about 53 years ago. His smoking use included cigarettes. He has never used smokeless tobacco. He reports that he does not drink alcohol or use drugs. PHYSICAL EXAM    (up to 7 for level 4, 8 or more for level 5)     ED Triage Vitals [20 2303]   BP Temp Temp Source Pulse Resp SpO2 Height Weight   (!) 141/60 97.6 °F (36.4 °C) Oral 56 20 99 % 5' 9\" (1.753 m) 153 lb 5 oz (69.5 kg)     Physical Exam  Vitals signs and nursing note reviewed. HENT:      Head: Contusion present. No laceration. Eyes:      Pupils: Pupils are equal, round, and reactive to light. Cardiovascular:      Rate and Rhythm: Normal rate. Pulses: Normal pulses. Musculoskeletal: Normal range of motion. Skin:     General: Skin is warm. Neurological:      Mental Status: He is alert. Psychiatric:         Cognition and Memory: Cognition is impaired. Memory is impaired. DIAGNOSTIC RESULTS     RADIOLOGY:   Non-plain film images such as CT, Ultrasound and MRI are read by the radiologist. Plain radiographic images are visualized and preliminarily interpreted by GAUTAM Heredia with the below findings:    Reviewed radiologist dictation. Interpretation per the Radiologist below, if available at the time of this note:    CT HEAD WO CONTRAST   Final Result   No acute intracranial abnormality. Stable generalized cerebral atrophy and mild chronic small vessel white   matter ischemic change. LABS:  Labs Reviewed - No data to display    All other labs were within normal range or not returned as of this dictation.     EMERGENCY DEPARTMENT COURSE and DIFFERENTIAL DIAGNOSIS/MDM:   Vitals:    Vitals: 01/27/20 2303 01/28/20 0002   BP: (!) 141/60 (!) 120/42   Pulse: 56 56   Resp: 20 18   Temp: 97.6 °F (36.4 °C)    TempSrc: Oral    SpO2: 99% 98%   Weight: 153 lb 5 oz (69.5 kg)    Height: 5' 9\" (1.753 m)      I discussed with Tara Mei and/or family the exam results, diagnosis, care, prognosis, reasons to return and the importance of follow up. Patient and/or family is in full agreement with plan and all questions have been answered. Specific discharge instructions explained, including reasons to return to the emergency department. Tara Mei is well appearing, non-toxic, and afebrile at the time of discharge. Patient fell trying to get up out of bed at home. Has a contusion to posterior scalp without open wound. No blood thinner use. He has dementia and is unable to relay history but wakes up and tells me he does not have pain. He moves arms and legs without difficulties afebrile not tachycardic or hypoxic. CT head without acute abnormality. Will discharge back to the care of his wife. I estimate there is LOW risk for SKULL FRACTURE, SUBARACHNOID HEMORRHAGE, INTRACRANIAL HEMORRHAGE, CERVICAL SPINE INJURY, TRAUMATIC BRAIN INJURY, SUBDURAL OR EPIDURAL HEMATOMA,  thus I consider the discharge disposition reasonable. CONSULTS:  None    PROCEDURES:  None    FINAL IMPRESSION      1. Fall on same level from slipping, tripping or stumbling, initial encounter    2. Injury of head, initial encounter    3.  Hematoma of scalp, initial encounter          DISPOSITION/PLAN   DISPOSITION Decision To Discharge 01/28/2020 12:32:30 AM      PATIENT REFERRED TO:  Kentrell Cason, [address] [place] [address]  [phone]    Call in 1 day  For follow up      DISCHARGE MEDICATIONS:  New Prescriptions    No medications on file       (Please note that portions of this note were completed with a voice recognition program.  Efforts were made to edit the dictations but occasionally words are

## 2020-01-28 NOTE — ED NOTES
Rema Flor patient's wife called and updated on plan of care and patient status.      Irene Fang RN  01/28/20 0030

## 2020-02-04 PROBLEM — L98.9 ARM SKIN LESION, LEFT: Status: RESOLVED | Noted: 2017-05-18 | Resolved: 2020-02-04

## 2020-02-04 PROBLEM — L30.9 DERMATITIS: Status: RESOLVED | Noted: 2017-03-06 | Resolved: 2020-02-04

## 2020-02-04 PROBLEM — R55 SYNCOPE AND COLLAPSE: Status: RESOLVED | Noted: 2018-04-06 | Resolved: 2020-02-04

## 2020-02-06 DIAGNOSIS — D64.9 ANEMIA, UNSPECIFIED TYPE: ICD-10-CM

## 2020-02-06 LAB
FERRITIN: 230.6 NG/ML (ref 30–400)
FOLATE: >20 NG/ML (ref 4.78–24.2)
IRON SATURATION: 39 % (ref 20–50)
IRON: 101 UG/DL (ref 59–158)
TOTAL IRON BINDING CAPACITY: 261 UG/DL (ref 260–445)
VITAMIN B-12: 378 PG/ML (ref 211–911)

## 2021-01-01 DIAGNOSIS — F03.90 DEMENTIA WITHOUT BEHAVIORAL DISTURBANCE, UNSPECIFIED DEMENTIA TYPE: ICD-10-CM

## 2021-01-01 DIAGNOSIS — E11.9 TYPE 2 DIABETES MELLITUS WITHOUT COMPLICATION, WITHOUT LONG-TERM CURRENT USE OF INSULIN (HCC): ICD-10-CM

## 2021-01-01 LAB
A/G RATIO: 1.9 (ref 1.1–2.2)
ALBUMIN SERPL-MCNC: 4.5 G/DL (ref 3.4–5)
ALP BLD-CCNC: 92 U/L (ref 40–129)
ALT SERPL-CCNC: 8 U/L (ref 10–40)
ANION GAP SERPL CALCULATED.3IONS-SCNC: 11 MMOL/L (ref 3–16)
AST SERPL-CCNC: 17 U/L (ref 15–37)
BILIRUB SERPL-MCNC: 0.5 MG/DL (ref 0–1)
BUN BLDV-MCNC: 26 MG/DL (ref 7–20)
CALCIUM SERPL-MCNC: 9.8 MG/DL (ref 8.3–10.6)
CHLORIDE BLD-SCNC: 106 MMOL/L (ref 99–110)
CO2: 24 MMOL/L (ref 21–32)
CREAT SERPL-MCNC: 1.2 MG/DL (ref 0.8–1.3)
ESTIMATED AVERAGE GLUCOSE: 111.2 MG/DL
GFR AFRICAN AMERICAN: >60
GFR NON-AFRICAN AMERICAN: 56
GLOBULIN: 2.4 G/DL
GLUCOSE BLD-MCNC: 109 MG/DL (ref 70–99)
HBA1C MFR BLD: 5.5 %
POTASSIUM SERPL-SCNC: 4.7 MMOL/L (ref 3.5–5.1)
SODIUM BLD-SCNC: 141 MMOL/L (ref 136–145)
TOTAL PROTEIN: 6.9 G/DL (ref 6.4–8.2)

## 2021-01-29 ENCOUNTER — IMMUNIZATION (OUTPATIENT)
Dept: PRIMARY CARE CLINIC | Age: 86
End: 2021-01-29
Payer: MEDICARE

## 2021-01-29 PROCEDURE — 91300 COVID-19, PFIZER VACCINE 30MCG/0.3ML DOSE: CPT | Performed by: FAMILY MEDICINE

## 2021-01-29 PROCEDURE — 0001A COVID-19, PFIZER VACCINE 30MCG/0.3ML DOSE: CPT | Performed by: FAMILY MEDICINE

## 2021-02-19 ENCOUNTER — IMMUNIZATION (OUTPATIENT)
Dept: PRIMARY CARE CLINIC | Age: 86
End: 2021-02-19
Payer: MEDICARE

## 2021-02-19 PROCEDURE — 0002A COVID-19, PFIZER VACCINE 30MCG/0.3ML DOSE: CPT | Performed by: FAMILY MEDICINE

## 2021-02-19 PROCEDURE — 91300 COVID-19, PFIZER VACCINE 30MCG/0.3ML DOSE: CPT | Performed by: FAMILY MEDICINE

## 2021-04-05 PROBLEM — M15.9 GENERALIZED OSTEOARTHROSIS, UNSPECIFIED SITE: Status: ACTIVE | Noted: 2021-04-05

## 2021-04-05 PROBLEM — K21.9 ESOPHAGEAL REFLUX: Status: ACTIVE | Noted: 2021-04-05

## 2021-04-05 PROBLEM — Z86.010 HISTORY OF COLONIC POLYPS: Status: ACTIVE | Noted: 2021-04-05

## 2021-12-31 NOTE — TELEPHONE ENCOUNTER
Please clarify this. Iron can make the stool, black however, so can bleeding. Unsure of specific symptoms or what they are requesting here. 31-Dec-2021

## 2022-01-01 ENCOUNTER — OFFICE VISIT (OUTPATIENT)
Dept: ORTHOPEDIC SURGERY | Age: 87
End: 2022-01-01
Payer: MEDICARE

## 2022-01-01 ENCOUNTER — TELEPHONE (OUTPATIENT)
Dept: ORTHOPEDIC SURGERY | Age: 87
End: 2022-01-01

## 2022-01-01 ENCOUNTER — HOSPITAL ENCOUNTER (INPATIENT)
Age: 87
LOS: 3 days | Discharge: HOSPICE | DRG: 552 | End: 2022-03-02
Attending: HOSPITALIST | Admitting: HOSPITALIST
Payer: MEDICARE

## 2022-01-01 VITALS — WEIGHT: 153 LBS | HEIGHT: 68 IN | BODY MASS INDEX: 23.19 KG/M2

## 2022-01-01 VITALS
BODY MASS INDEX: 21.45 KG/M2 | RESPIRATION RATE: 14 BRPM | HEART RATE: 95 BPM | SYSTOLIC BLOOD PRESSURE: 108 MMHG | TEMPERATURE: 97.7 F | OXYGEN SATURATION: 93 % | WEIGHT: 144.84 LBS | DIASTOLIC BLOOD PRESSURE: 74 MMHG | HEIGHT: 69 IN

## 2022-01-01 DIAGNOSIS — E44.1 MILD MALNUTRITION (HCC): Chronic | ICD-10-CM

## 2022-01-01 DIAGNOSIS — F51.01 PRIMARY INSOMNIA: ICD-10-CM
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
## 2022-01-01 DIAGNOSIS — R62.7 FAILURE TO THRIVE IN ADULT: ICD-10-CM

## 2022-01-01 DIAGNOSIS — R53.1 GENERALIZED WEAKNESS: ICD-10-CM

## 2022-01-01 DIAGNOSIS — E11.9 TYPE 2 DIABETES MELLITUS WITHOUT COMPLICATION, WITHOUT LONG-TERM CURRENT USE OF INSULIN (HCC): ICD-10-CM

## 2022-01-01 DIAGNOSIS — M51.34 THORACIC DEGENERATIVE DISC DISEASE: ICD-10-CM

## 2022-01-01 DIAGNOSIS — G89.4 CHRONIC PAIN SYNDROME: ICD-10-CM

## 2022-01-01 DIAGNOSIS — G89.29 CHRONIC UPPER BACK PAIN: ICD-10-CM

## 2022-01-01 DIAGNOSIS — M51.36 LUMBAR DEGENERATIVE DISC DISEASE: Primary | ICD-10-CM

## 2022-01-01 DIAGNOSIS — M54.9 CHRONIC UPPER BACK PAIN: ICD-10-CM

## 2022-01-01 DIAGNOSIS — M41.50 DEGENERATIVE SCOLIOSIS: ICD-10-CM

## 2022-01-01 DIAGNOSIS — M54.50 LOW BACK PAIN, UNSPECIFIED BACK PAIN LATERALITY, UNSPECIFIED CHRONICITY, UNSPECIFIED WHETHER SCIATICA PRESENT: ICD-10-CM

## 2022-01-01 DIAGNOSIS — R26.2 UNABLE TO AMBULATE: ICD-10-CM

## 2022-01-01 DIAGNOSIS — R33.9 URINARY RETENTION: ICD-10-CM

## 2022-01-01 LAB
A/G RATIO: 1.5 (ref 1.1–2.2)
ALBUMIN SERPL-MCNC: 4 G/DL (ref 3.4–5)
ALP BLD-CCNC: 84 U/L (ref 40–129)
ALT SERPL-CCNC: 6 U/L (ref 10–40)
ANION GAP SERPL CALCULATED.3IONS-SCNC: 11 MMOL/L (ref 3–16)
ANION GAP SERPL CALCULATED.3IONS-SCNC: 12 MMOL/L (ref 3–16)
ANION GAP SERPL CALCULATED.3IONS-SCNC: 18 MMOL/L (ref 3–16)
AST SERPL-CCNC: 16 U/L (ref 15–37)
BASOPHILS ABSOLUTE: 0 K/UL (ref 0–0.2)
BASOPHILS ABSOLUTE: 0 K/UL (ref 0–0.2)
BASOPHILS RELATIVE PERCENT: 0.2 %
BASOPHILS RELATIVE PERCENT: 0.4 %
BILIRUB SERPL-MCNC: 0.5 MG/DL (ref 0–1)
BILIRUBIN URINE: NEGATIVE
BLOOD, URINE: NEGATIVE
BUN BLDV-MCNC: 17 MG/DL (ref 7–20)
BUN BLDV-MCNC: 19 MG/DL (ref 7–20)
BUN BLDV-MCNC: 20 MG/DL (ref 7–20)
CALCIUM SERPL-MCNC: 9.3 MG/DL (ref 8.3–10.6)
CALCIUM SERPL-MCNC: 9.4 MG/DL (ref 8.3–10.6)
CALCIUM SERPL-MCNC: 9.9 MG/DL (ref 8.3–10.6)
CHLORIDE BLD-SCNC: 103 MMOL/L (ref 99–110)
CHLORIDE BLD-SCNC: 103 MMOL/L (ref 99–110)
CHLORIDE BLD-SCNC: 107 MMOL/L (ref 99–110)
CLARITY: CLEAR
CO2: 19 MMOL/L (ref 21–32)
CO2: 20 MMOL/L (ref 21–32)
CO2: 23 MMOL/L (ref 21–32)
COLOR: YELLOW
CREAT SERPL-MCNC: 1.1 MG/DL (ref 0.8–1.3)
EKG ATRIAL RATE: 71 BPM
EKG DIAGNOSIS: NORMAL
EKG P AXIS: -20 DEGREES
EKG P-R INTERVAL: 250 MS
EKG Q-T INTERVAL: 400 MS
EKG QRS DURATION: 76 MS
EKG QTC CALCULATION (BAZETT): 434 MS
EKG R AXIS: -11 DEGREES
EKG T AXIS: 10 DEGREES
EKG VENTRICULAR RATE: 71 BPM
EOSINOPHILS ABSOLUTE: 0 K/UL (ref 0–0.6)
EOSINOPHILS ABSOLUTE: 0 K/UL (ref 0–0.6)
EOSINOPHILS RELATIVE PERCENT: 0.5 %
EOSINOPHILS RELATIVE PERCENT: 0.5 %
ESTIMATED AVERAGE GLUCOSE: 111.2 MG/DL
FERRITIN: 117.4 NG/ML (ref 30–400)
GFR AFRICAN AMERICAN: >60
GFR NON-AFRICAN AMERICAN: >60
GLUCOSE BLD-MCNC: 100 MG/DL (ref 70–99)
GLUCOSE BLD-MCNC: 103 MG/DL (ref 70–99)
GLUCOSE BLD-MCNC: 105 MG/DL (ref 70–99)
GLUCOSE BLD-MCNC: 107 MG/DL (ref 70–99)
GLUCOSE BLD-MCNC: 109 MG/DL (ref 70–99)
GLUCOSE BLD-MCNC: 111 MG/DL (ref 70–99)
GLUCOSE BLD-MCNC: 116 MG/DL (ref 70–99)
GLUCOSE BLD-MCNC: 124 MG/DL (ref 70–99)
GLUCOSE BLD-MCNC: 125 MG/DL (ref 70–99)
GLUCOSE BLD-MCNC: 130 MG/DL (ref 70–99)
GLUCOSE BLD-MCNC: 140 MG/DL (ref 70–99)
GLUCOSE BLD-MCNC: 141 MG/DL (ref 70–99)
GLUCOSE BLD-MCNC: 151 MG/DL (ref 70–99)
GLUCOSE BLD-MCNC: 151 MG/DL (ref 70–99)
GLUCOSE BLD-MCNC: 162 MG/DL (ref 70–99)
GLUCOSE URINE: NEGATIVE MG/DL
HBA1C MFR BLD: 5.5 %
HCT VFR BLD CALC: 37.3 % (ref 40.5–52.5)
HCT VFR BLD CALC: 37.6 % (ref 40.5–52.5)
HCT VFR BLD CALC: 38.4 % (ref 40.5–52.5)
HEMOGLOBIN: 12.3 G/DL (ref 13.5–17.5)
HEMOGLOBIN: 12.5 G/DL (ref 13.5–17.5)
HEMOGLOBIN: 12.8 G/DL (ref 13.5–17.5)
IRON SATURATION: 29 % (ref 20–50)
IRON: 72 UG/DL (ref 59–158)
KETONES, URINE: NEGATIVE MG/DL
LEUKOCYTE ESTERASE, URINE: NEGATIVE
LYMPHOCYTES ABSOLUTE: 0.7 K/UL (ref 1–5.1)
LYMPHOCYTES ABSOLUTE: 1 K/UL (ref 1–5.1)
LYMPHOCYTES RELATIVE PERCENT: 15.1 %
LYMPHOCYTES RELATIVE PERCENT: 16.9 %
MCH RBC QN AUTO: 31.9 PG (ref 26–34)
MCH RBC QN AUTO: 32 PG (ref 26–34)
MCH RBC QN AUTO: 32.1 PG (ref 26–34)
MCHC RBC AUTO-ENTMCNC: 33 G/DL (ref 31–36)
MCHC RBC AUTO-ENTMCNC: 33.3 G/DL (ref 31–36)
MCHC RBC AUTO-ENTMCNC: 33.4 G/DL (ref 31–36)
MCV RBC AUTO: 95.7 FL (ref 80–100)
MCV RBC AUTO: 96.2 FL (ref 80–100)
MCV RBC AUTO: 96.8 FL (ref 80–100)
MICROSCOPIC EXAMINATION: NORMAL
MONOCYTES ABSOLUTE: 0.2 K/UL (ref 0–1.3)
MONOCYTES ABSOLUTE: 0.2 K/UL (ref 0–1.3)
MONOCYTES RELATIVE PERCENT: 4.1 %
MONOCYTES RELATIVE PERCENT: 4.2 %
NEUTROPHILS ABSOLUTE: 3.8 K/UL (ref 1.7–7.7)
NEUTROPHILS ABSOLUTE: 4.4 K/UL (ref 1.7–7.7)
NEUTROPHILS RELATIVE PERCENT: 78.3 %
NEUTROPHILS RELATIVE PERCENT: 79.8 %
NITRITE, URINE: NEGATIVE
PDW BLD-RTO: 13.8 % (ref 12.4–15.4)
PDW BLD-RTO: 13.8 % (ref 12.4–15.4)
PDW BLD-RTO: 13.9 % (ref 12.4–15.4)
PERFORMED ON: ABNORMAL
PH UA: 6 (ref 5–8)
PLATELET # BLD: 123 K/UL (ref 135–450)
PLATELET # BLD: 134 K/UL (ref 135–450)
PLATELET # BLD: 135 K/UL (ref 135–450)
PMV BLD AUTO: 8.4 FL (ref 5–10.5)
PMV BLD AUTO: 8.4 FL (ref 5–10.5)
PMV BLD AUTO: 9 FL (ref 5–10.5)
POTASSIUM REFLEX MAGNESIUM: 3.9 MMOL/L (ref 3.5–5.1)
POTASSIUM REFLEX MAGNESIUM: 4.4 MMOL/L (ref 3.5–5.1)
POTASSIUM SERPL-SCNC: 3.8 MMOL/L (ref 3.5–5.1)
PROTEIN UA: NEGATIVE MG/DL
RBC # BLD: 3.85 M/UL (ref 4.2–5.9)
RBC # BLD: 3.93 M/UL (ref 4.2–5.9)
RBC # BLD: 4 M/UL (ref 4.2–5.9)
SODIUM BLD-SCNC: 135 MMOL/L (ref 136–145)
SODIUM BLD-SCNC: 140 MMOL/L (ref 136–145)
SODIUM BLD-SCNC: 141 MMOL/L (ref 136–145)
SPECIFIC GRAVITY UA: 1.02 (ref 1–1.03)
TOTAL IRON BINDING CAPACITY: 248 UG/DL (ref 260–445)
TOTAL PROTEIN: 6.6 G/DL (ref 6.4–8.2)
TROPONIN: 0.01 NG/ML
URINE REFLEX TO CULTURE: NORMAL
URINE TYPE: NORMAL
UROBILINOGEN, URINE: 0.2 E.U./DL
WBC # BLD: 4.7 K/UL (ref 4–11)
WBC # BLD: 5.6 K/UL (ref 4–11)
WBC # BLD: 8.1 K/UL (ref 4–11)

## 2022-01-01 PROCEDURE — 6370000000 HC RX 637 (ALT 250 FOR IP): Performed by: HOSPITALIST

## 2022-01-01 PROCEDURE — 1036F TOBACCO NON-USER: CPT | Performed by: ORTHOPAEDIC SURGERY

## 2022-01-01 PROCEDURE — G8420 CALC BMI NORM PARAMETERS: HCPCS | Performed by: ORTHOPAEDIC SURGERY

## 2022-01-01 PROCEDURE — 81003 URINALYSIS AUTO W/O SCOPE: CPT

## 2022-01-01 PROCEDURE — 99284 EMERGENCY DEPT VISIT MOD MDM: CPT

## 2022-01-01 PROCEDURE — 80048 BASIC METABOLIC PNL TOTAL CA: CPT

## 2022-01-01 PROCEDURE — 94760 N-INVAS EAR/PLS OXIMETRY 1: CPT

## 2022-01-01 PROCEDURE — G8484 FLU IMMUNIZE NO ADMIN: HCPCS | Performed by: ORTHOPAEDIC SURGERY

## 2022-01-01 PROCEDURE — 93005 ELECTROCARDIOGRAM TRACING: CPT | Performed by: PHYSICIAN ASSISTANT

## 2022-01-01 PROCEDURE — 93010 ELECTROCARDIOGRAM REPORT: CPT | Performed by: INTERNAL MEDICINE

## 2022-01-01 PROCEDURE — 85025 COMPLETE CBC W/AUTO DIFF WBC: CPT

## 2022-01-01 PROCEDURE — 36415 COLL VENOUS BLD VENIPUNCTURE: CPT

## 2022-01-01 PROCEDURE — 1200000000 HC SEMI PRIVATE

## 2022-01-01 PROCEDURE — 99203 OFFICE O/P NEW LOW 30 MIN: CPT | Performed by: ORTHOPAEDIC SURGERY

## 2022-01-01 PROCEDURE — 2580000003 HC RX 258: Performed by: HOSPITALIST

## 2022-01-01 PROCEDURE — 4040F PNEUMOC VAC/ADMIN/RCVD: CPT | Performed by: ORTHOPAEDIC SURGERY

## 2022-01-01 PROCEDURE — 2500000003 HC RX 250 WO HCPCS: Performed by: HOSPITALIST

## 2022-01-01 PROCEDURE — 6360000002 HC RX W HCPCS: Performed by: HOSPITALIST

## 2022-01-01 PROCEDURE — 84484 ASSAY OF TROPONIN QUANT: CPT

## 2022-01-01 PROCEDURE — 80053 COMPREHEN METABOLIC PANEL: CPT

## 2022-01-01 PROCEDURE — G8427 DOCREV CUR MEDS BY ELIG CLIN: HCPCS | Performed by: ORTHOPAEDIC SURGERY

## 2022-01-01 PROCEDURE — 85027 COMPLETE CBC AUTOMATED: CPT

## 2022-01-01 PROCEDURE — 1123F ACP DISCUSS/DSCN MKR DOCD: CPT | Performed by: ORTHOPAEDIC SURGERY

## 2022-01-01 PROCEDURE — 83036 HEMOGLOBIN GLYCOSYLATED A1C: CPT

## 2022-01-01 PROCEDURE — 9990000010 HC NO CHARGE VISIT

## 2022-01-01 RX ORDER — CLONIDINE HYDROCHLORIDE 0.1 MG/1
0.1 TABLET ORAL 2 TIMES DAILY
Status: DISCONTINUED | OUTPATIENT
Start: 2022-01-01 | End: 2022-01-01 | Stop reason: HOSPADM

## 2022-01-01 RX ORDER — MORPHINE SULFATE 100 MG/5ML
5 SOLUTION ORAL
Qty: 30 ML | Refills: 0 | Status: SHIPPED | OUTPATIENT
Start: 2022-01-01 | End: 2022-01-01

## 2022-01-01 RX ORDER — ACETAMINOPHEN 325 MG/1
650 TABLET ORAL EVERY 6 HOURS PRN
Status: DISCONTINUED | OUTPATIENT
Start: 2022-01-01 | End: 2022-01-01 | Stop reason: HOSPADM

## 2022-01-01 RX ORDER — DEXTROSE MONOHYDRATE 50 MG/ML
100 INJECTION, SOLUTION INTRAVENOUS PRN
Status: DISCONTINUED | OUTPATIENT
Start: 2022-01-01 | End: 2022-01-01 | Stop reason: HOSPADM

## 2022-01-01 RX ORDER — ACETAMINOPHEN 650 MG/1
650 SUPPOSITORY RECTAL EVERY 6 HOURS PRN
Status: DISCONTINUED | OUTPATIENT
Start: 2022-01-01 | End: 2022-01-01 | Stop reason: HOSPADM

## 2022-01-01 RX ORDER — SODIUM CHLORIDE 9 MG/ML
25 INJECTION, SOLUTION INTRAVENOUS PRN
Status: DISCONTINUED | OUTPATIENT
Start: 2022-01-01 | End: 2022-01-01 | Stop reason: HOSPADM

## 2022-01-01 RX ORDER — HYDROCODONE BITARTRATE AND ACETAMINOPHEN 5; 325 MG/1; MG/1
1 TABLET ORAL EVERY 6 HOURS PRN
Status: DISCONTINUED | OUTPATIENT
Start: 2022-01-01 | End: 2022-01-01 | Stop reason: HOSPADM

## 2022-01-01 RX ORDER — SODIUM CHLORIDE 0.9 % (FLUSH) 0.9 %
5-40 SYRINGE (ML) INJECTION PRN
Status: DISCONTINUED | OUTPATIENT
Start: 2022-01-01 | End: 2022-01-01 | Stop reason: HOSPADM

## 2022-01-01 RX ORDER — DEXTROSE MONOHYDRATE 25 G/50ML
12.5 INJECTION, SOLUTION INTRAVENOUS PRN
Status: DISCONTINUED | OUTPATIENT
Start: 2022-01-01 | End: 2022-01-01 | Stop reason: HOSPADM

## 2022-01-01 RX ORDER — OXYCODONE AND ACETAMINOPHEN 10; 325 MG/1; MG/1
1 TABLET ORAL EVERY 4 HOURS PRN
Status: DISCONTINUED | OUTPATIENT
Start: 2022-01-01 | End: 2022-01-01 | Stop reason: HOSPADM

## 2022-01-01 RX ORDER — ONDANSETRON 4 MG/1
4 TABLET, ORALLY DISINTEGRATING ORAL EVERY 8 HOURS PRN
Status: DISCONTINUED | OUTPATIENT
Start: 2022-01-01 | End: 2022-01-01 | Stop reason: HOSPADM

## 2022-01-01 RX ORDER — OLANZAPINE 5 MG/1
5 TABLET ORAL 3 TIMES DAILY
Status: DISCONTINUED | OUTPATIENT
Start: 2022-01-01 | End: 2022-01-01 | Stop reason: HOSPADM

## 2022-01-01 RX ORDER — AMLODIPINE BESYLATE 5 MG/1
5 TABLET ORAL DAILY
Status: DISCONTINUED | OUTPATIENT
Start: 2022-01-01 | End: 2022-01-01 | Stop reason: HOSPADM

## 2022-01-01 RX ORDER — ONDANSETRON 2 MG/ML
4 INJECTION INTRAMUSCULAR; INTRAVENOUS EVERY 6 HOURS PRN
Status: DISCONTINUED | OUTPATIENT
Start: 2022-01-01 | End: 2022-01-01 | Stop reason: HOSPADM

## 2022-01-01 RX ORDER — ALPRAZOLAM 0.25 MG/1
0.25 TABLET ORAL NIGHTLY PRN
Status: DISCONTINUED | OUTPATIENT
Start: 2022-01-01 | End: 2022-01-01

## 2022-01-01 RX ORDER — BISACODYL 10 MG
10 SUPPOSITORY, RECTAL RECTAL DAILY PRN
Status: DISCONTINUED | OUTPATIENT
Start: 2022-01-01 | End: 2022-01-01 | Stop reason: HOSPADM

## 2022-01-01 RX ORDER — ALPRAZOLAM 0.5 MG/1
0.5 TABLET ORAL 2 TIMES DAILY
Qty: 60 TABLET | Refills: 0 | Status: SHIPPED | OUTPATIENT
Start: 2022-01-01 | End: 2022-01-01 | Stop reason: HOSPADM

## 2022-01-01 RX ORDER — ALPRAZOLAM 0.5 MG/1
0.5 TABLET ORAL 2 TIMES DAILY
Status: DISCONTINUED | OUTPATIENT
Start: 2022-01-01 | End: 2022-01-01 | Stop reason: HOSPADM

## 2022-01-01 RX ORDER — SODIUM CHLORIDE 0.9 % (FLUSH) 0.9 %
5-40 SYRINGE (ML) INJECTION EVERY 12 HOURS SCHEDULED
Status: DISCONTINUED | OUTPATIENT
Start: 2022-01-01 | End: 2022-01-01 | Stop reason: HOSPADM

## 2022-01-01 RX ORDER — TAMSULOSIN HYDROCHLORIDE 0.4 MG/1
0.4 CAPSULE ORAL DAILY
Status: DISCONTINUED | OUTPATIENT
Start: 2022-01-01 | End: 2022-01-01 | Stop reason: HOSPADM

## 2022-01-01 RX ORDER — MIRTAZAPINE 15 MG/1
30 TABLET, FILM COATED ORAL NIGHTLY
Status: DISCONTINUED | OUTPATIENT
Start: 2022-01-01 | End: 2022-01-01 | Stop reason: HOSPADM

## 2022-01-01 RX ORDER — OLANZAPINE 5 MG/1
5 TABLET ORAL 3 TIMES DAILY
Qty: 90 TABLET | Refills: 3 | Status: SHIPPED | OUTPATIENT
Start: 2022-01-01

## 2022-01-01 RX ORDER — OLANZAPINE 10 MG/1
10 INJECTION, POWDER, LYOPHILIZED, FOR SOLUTION INTRAMUSCULAR ONCE
Status: COMPLETED | OUTPATIENT
Start: 2022-01-01 | End: 2022-01-01

## 2022-01-01 RX ORDER — FAMOTIDINE 20 MG/1
20 TABLET, FILM COATED ORAL DAILY
Status: DISCONTINUED | OUTPATIENT
Start: 2022-01-01 | End: 2022-01-01 | Stop reason: HOSPADM

## 2022-01-01 RX ORDER — NICOTINE POLACRILEX 4 MG
15 LOZENGE BUCCAL PRN
Status: DISCONTINUED | OUTPATIENT
Start: 2022-01-01 | End: 2022-01-01 | Stop reason: HOSPADM

## 2022-01-01 RX ORDER — LORAZEPAM 2 MG/ML
1 CONCENTRATE ORAL EVERY 4 HOURS PRN
Qty: 10 ML | Refills: 1 | Status: SHIPPED | OUTPATIENT
Start: 2022-01-01 | End: 2022-01-01

## 2022-01-01 RX ORDER — OLANZAPINE 5 MG/1
5 TABLET ORAL 3 TIMES DAILY
Qty: 30 TABLET | Refills: 3 | Status: SHIPPED | OUTPATIENT
Start: 2022-01-01 | End: 2022-01-01

## 2022-01-01 RX ADMIN — ALPRAZOLAM 0.5 MG: 0.5 TABLET ORAL at 13:07

## 2022-01-01 RX ADMIN — INSULIN LISPRO 1 UNITS: 100 INJECTION, SOLUTION INTRAVENOUS; SUBCUTANEOUS at 10:19

## 2022-01-01 RX ADMIN — ENOXAPARIN SODIUM 40 MG: 100 INJECTION SUBCUTANEOUS at 09:42

## 2022-01-01 RX ADMIN — FAMOTIDINE 20 MG: 20 TABLET ORAL at 09:42

## 2022-01-01 RX ADMIN — MIRTAZAPINE 30 MG: 15 TABLET, FILM COATED ORAL at 22:04

## 2022-01-01 RX ADMIN — HYDROCODONE BITARTRATE AND ACETAMINOPHEN 1 TABLET: 5; 325 TABLET ORAL at 16:51

## 2022-01-01 RX ADMIN — ENOXAPARIN SODIUM 40 MG: 100 INJECTION SUBCUTANEOUS at 21:05

## 2022-01-01 RX ADMIN — ENOXAPARIN SODIUM 40 MG: 100 INJECTION SUBCUTANEOUS at 10:18

## 2022-01-01 RX ADMIN — HYDROCODONE BITARTRATE AND ACETAMINOPHEN 1 TABLET: 5; 325 TABLET ORAL at 21:57

## 2022-01-01 RX ADMIN — TAMSULOSIN HYDROCHLORIDE 0.4 MG: 0.4 CAPSULE ORAL at 10:19

## 2022-01-01 RX ADMIN — HYDROCODONE BITARTRATE AND ACETAMINOPHEN 1 TABLET: 5; 325 TABLET ORAL at 15:50

## 2022-01-01 RX ADMIN — CLONIDINE HYDROCHLORIDE 0.1 MG: 0.1 TABLET ORAL at 21:04

## 2022-01-01 RX ADMIN — ENOXAPARIN SODIUM 40 MG: 100 INJECTION SUBCUTANEOUS at 08:31

## 2022-01-01 RX ADMIN — AMLODIPINE BESYLATE 5 MG: 5 TABLET ORAL at 10:18

## 2022-01-01 RX ADMIN — CLONIDINE HYDROCHLORIDE 0.1 MG: 0.1 TABLET ORAL at 22:09

## 2022-01-01 RX ADMIN — OLANZAPINE 5 MG: 5 TABLET, FILM COATED ORAL at 16:51

## 2022-01-01 RX ADMIN — TAMSULOSIN HYDROCHLORIDE 0.4 MG: 0.4 CAPSULE ORAL at 09:42

## 2022-01-01 RX ADMIN — OLANZAPINE 5 MG: 5 TABLET, FILM COATED ORAL at 09:42

## 2022-01-01 RX ADMIN — FAMOTIDINE 20 MG: 20 TABLET ORAL at 08:29

## 2022-01-01 RX ADMIN — ALPRAZOLAM 0.5 MG: 0.5 TABLET ORAL at 10:18

## 2022-01-01 RX ADMIN — OLANZAPINE 10 MG: 10 INJECTION, POWDER, FOR SOLUTION INTRAMUSCULAR at 12:33

## 2022-01-01 RX ADMIN — OLANZAPINE 5 MG: 5 TABLET, FILM COATED ORAL at 10:18

## 2022-01-01 RX ADMIN — CLONIDINE HYDROCHLORIDE 0.1 MG: 0.1 TABLET ORAL at 09:42

## 2022-01-01 RX ADMIN — INSULIN LISPRO 1 UNITS: 100 INJECTION, SOLUTION INTRAVENOUS; SUBCUTANEOUS at 13:07

## 2022-01-01 RX ADMIN — ALPRAZOLAM 0.5 MG: 0.5 TABLET ORAL at 22:08

## 2022-01-01 RX ADMIN — AMLODIPINE BESYLATE 5 MG: 5 TABLET ORAL at 08:30

## 2022-01-01 RX ADMIN — MIRTAZAPINE 30 MG: 15 TABLET, FILM COATED ORAL at 22:14

## 2022-01-01 RX ADMIN — TAMSULOSIN HYDROCHLORIDE 0.4 MG: 0.4 CAPSULE ORAL at 08:30

## 2022-01-01 RX ADMIN — OLANZAPINE 5 MG: 5 TABLET, FILM COATED ORAL at 22:10

## 2022-01-01 RX ADMIN — AMLODIPINE BESYLATE 5 MG: 5 TABLET ORAL at 09:42

## 2022-01-01 RX ADMIN — INSULIN LISPRO 1 UNITS: 100 INJECTION, SOLUTION INTRAVENOUS; SUBCUTANEOUS at 22:15

## 2022-01-01 RX ADMIN — CLONIDINE HYDROCHLORIDE 0.1 MG: 0.1 TABLET ORAL at 08:29

## 2022-01-01 RX ADMIN — SODIUM CHLORIDE, PRESERVATIVE FREE 10 ML: 5 INJECTION INTRAVENOUS at 08:31

## 2022-01-01 RX ADMIN — OLANZAPINE 5 MG: 5 TABLET, FILM COATED ORAL at 22:04

## 2022-01-01 RX ADMIN — SODIUM CHLORIDE, PRESERVATIVE FREE 10 ML: 5 INJECTION INTRAVENOUS at 21:04

## 2022-01-01 RX ADMIN — CLONIDINE HYDROCHLORIDE 0.1 MG: 0.1 TABLET ORAL at 22:04

## 2022-01-01 RX ADMIN — INSULIN LISPRO 1 UNITS: 100 INJECTION, SOLUTION INTRAVENOUS; SUBCUTANEOUS at 21:10

## 2022-01-01 RX ADMIN — FAMOTIDINE 20 MG: 20 TABLET ORAL at 11:47

## 2022-01-01 RX ADMIN — FAMOTIDINE 20 MG: 20 TABLET ORAL at 18:17

## 2022-01-01 RX ADMIN — CLONIDINE HYDROCHLORIDE 0.1 MG: 0.1 TABLET ORAL at 10:18

## 2022-01-01 RX ADMIN — MIRTAZAPINE 30 MG: 15 TABLET, FILM COATED ORAL at 21:04

## 2022-01-01 RX ADMIN — ALPRAZOLAM 0.25 MG: 0.25 TABLET ORAL at 21:04

## 2022-01-01 ASSESSMENT — PAIN SCALES - PAIN ASSESSMENT IN ADVANCED DEMENTIA (PAINAD)
NEGVOCALIZATION: 0
FACIALEXPRESSION: 0
NEGVOCALIZATION: 0
NEGVOCALIZATION: 0
FACIALEXPRESSION: 0
CONSOLABILITY: 0
BREATHING: 0
TOTALSCORE: 0
BODYLANGUAGE: 1
NEGVOCALIZATION: 0
TOTALSCORE: 1
FACIALEXPRESSION: 0
NEGVOCALIZATION: 0
BODYLANGUAGE: 1
FACIALEXPRESSION: 0
BODYLANGUAGE: 1
FACIALEXPRESSION: 0
CONSOLABILITY: 0
BODYLANGUAGE: 0
BREATHING: 0
NEGVOCALIZATION: 0
NEGVOCALIZATION: 0
BREATHING: 0
BREATHING: 0
FACIALEXPRESSION: 0
BREATHING: 0
BODYLANGUAGE: 0
BODYLANGUAGE: 0
CONSOLABILITY: 0
BODYLANGUAGE: 0
CONSOLABILITY: 0
BODYLANGUAGE: 1
BREATHING: 0
FACIALEXPRESSION: 0
BODYLANGUAGE: 0
CONSOLABILITY: 0
FACIALEXPRESSION: 0
TOTALSCORE: 0
BODYLANGUAGE: 1
BODYLANGUAGE: 0
FACIALEXPRESSION: 0
BODYLANGUAGE: 1
NEGVOCALIZATION: 0
TOTALSCORE: 0
CONSOLABILITY: 0
TOTALSCORE: 0
TOTALSCORE: 2
TOTALSCORE: 0
FACIALEXPRESSION: 0
NEGVOCALIZATION: 0
FACIALEXPRESSION: 0
CONSOLABILITY: 0
FACIALEXPRESSION: 0
CONSOLABILITY: 0
FACIALEXPRESSION: 0
TOTALSCORE: 1
BODYLANGUAGE: 0
NEGVOCALIZATION: 1
TOTALSCORE: 0
BODYLANGUAGE: 0
FACIALEXPRESSION: 1
CONSOLABILITY: 0
BREATHING: 0
BREATHING: 0
BODYLANGUAGE: 1
TOTALSCORE: 0
NEGVOCALIZATION: 0
BODYLANGUAGE: 0
BREATHING: 0
CONSOLABILITY: 0
BREATHING: 0
CONSOLABILITY: 0
BREATHING: 0
CONSOLABILITY: 1
FACIALEXPRESSION: 0
TOTALSCORE: 1
NEGVOCALIZATION: 2
BREATHING: 0
NEGVOCALIZATION: 0
FACIALEXPRESSION: 0
NEGVOCALIZATION: 0
CONSOLABILITY: 0
TOTALSCORE: 0
CONSOLABILITY: 1
NEGVOCALIZATION: 0
TOTALSCORE: 1
BREATHING: 0
NEGVOCALIZATION: 0
TOTALSCORE: 2
BODYLANGUAGE: 1
CONSOLABILITY: 0
TOTALSCORE: 1
TOTALSCORE: 0
TOTALSCORE: 6
BREATHING: 0
CONSOLABILITY: 0
BODYLANGUAGE: 0
BREATHING: 0
NEGVOCALIZATION: 0
TOTALSCORE: 0
NEGVOCALIZATION: 0
NEGVOCALIZATION: 0
TOTALSCORE: 1
BODYLANGUAGE: 0
TOTALSCORE: 0
FACIALEXPRESSION: 0
BREATHING: 0
TOTALSCORE: 0
CONSOLABILITY: 0
BREATHING: 0
BREATHING: 1
FACIALEXPRESSION: 0
BREATHING: 0
FACIALEXPRESSION: 0
CONSOLABILITY: 0
CONSOLABILITY: 0
BREATHING: 0
BREATHING: 0
BODYLANGUAGE: 0
BODYLANGUAGE: 1

## 2022-01-01 ASSESSMENT — PAIN - FUNCTIONAL ASSESSMENT: PAIN_FUNCTIONAL_ASSESSMENT: 0-10

## 2022-01-01 ASSESSMENT — PAIN SCALES - WONG BAKER
WONGBAKER_NUMERICALRESPONSE: 0
WONGBAKER_NUMERICALRESPONSE: 0

## 2022-01-01 ASSESSMENT — PAIN SCALES - GENERAL
PAINLEVEL_OUTOF10: 0
PAINLEVEL_OUTOF10: 6
PAINLEVEL_OUTOF10: 0
PAINLEVEL_OUTOF10: 9
PAINLEVEL_OUTOF10: 0
PAINLEVEL_OUTOF10: 7
PAINLEVEL_OUTOF10: 2
PAINLEVEL_OUTOF10: 0
PAINLEVEL_OUTOF10: 9
PAINLEVEL_OUTOF10: 0
PAINLEVEL_OUTOF10: 9

## 2022-01-01 ASSESSMENT — PAIN DESCRIPTION - DESCRIPTORS
DESCRIPTORS: ACHING
DESCRIPTORS: ACHING

## 2022-01-01 ASSESSMENT — ENCOUNTER SYMPTOMS
SORE THROAT: 0
ABDOMINAL PAIN: 0
SHORTNESS OF BREATH: 0
VOMITING: 0
BACK PAIN: 1
NAUSEA: 0

## 2022-01-01 ASSESSMENT — PAIN DESCRIPTION - LOCATION
LOCATION: BACK
LOCATION: LEG

## 2022-01-01 ASSESSMENT — PAIN DESCRIPTION - PAIN TYPE
TYPE: ACUTE PAIN

## 2022-01-01 ASSESSMENT — PAIN DESCRIPTION - ORIENTATION: ORIENTATION: RIGHT

## 2022-01-12 NOTE — PROGRESS NOTES
Initial Evaluation of the Back                                                                     1/12/2022  [facility]     10/28/1927       History of Present Illness:    Xavier Gorman is seen for Back Pain (Low Back)    This is a 80year-old patient of Dr. Cullen Pace who is seen for evaluation of chronic mid and low back pain. He is accompanied by his wife to the office today. He is very hard of hearing and much of the information is given by his wife. He has a long history of back pain and underwent an operation by Dr. Sona Huerta around 2007. Rogers Trammell He is also status post placement of a spinal cord stimulator apparently at the level of his mid thoracic spine. He states the stimulator really was not efficacious in relieving his pain and he is bothered by the stimulator remains adjacent to his right sacroiliac joint. He continues to complain of pain from his lower thoracic spine to his sacrum and the right paraspinous region. He complains of weakness in his lower extremities and does require a walker for ambulation. He does utilize hydrocodone chronically for his pain. Medical history was reviewed. He does have a history of cardiac disease and hypertension. Social history was reviewed. He is a [address] [place] [address] [place] who has been in the [place] since around 5 his wife accompanied him about a year later. He is retired from [email or web address] here in [place]. He and his wife live together in a condominium. He remains essentially homebound but ambulates about the condo on a walker. He is able to bathe and dress himself but does no other chores.              Ht 5' 8\" (1.727 m)   Wt 153 lb (69.4 kg)   BMI 23.26 kg/m²     Physical examination:    General Appearance: no acute distress, alert, oriented x 3, appropriate mood and affect  Deformity:      Palpation/Tenderness: yes - over Lumbar Spine Left paraspinous  Well healed Scar mid thoracic to mid lumbar      Strength Scale: 1-5   Extensor Hallucis Longus L5 Anterior Tibialis   L4 Quadriceps   L2,3,4    Right 5 5 5   Left 5 5 5     Reflex Exam: 0-4+   Achilles Tendon (gastroc)   S1 Patellar Tendon (quads)   L4   Right 0 0   Left 0 0     Sensory Exam: Normal    Special Testing:   Straight leg raise Reverse straight leg raise Contralateral straight leg raise Log roll Harjinder test (cristian) Babinski Clonus   Right      neg neg   Left      neg neg     X-Ray Findings taken in Office: 2 views lumbar spine read by myself show multilevel degenerative disc disease involving the entire lumbar spine associated with degenerative scoliosis. There is marked narrowing of his lumbar disc spaces L1-L4 but no evidence of compression fracture. There is noted to be a spinal cord stimulator placed over his right ilium with the wire extending to his mid thoracic spine. Impression:   1. severe multilevel degenerative disc disease with associated degenerative scoliosis. 2.  Associated chronic lower thoracic and lumbar pain. 3.  No evidence of radiculopathy on physical examination. 4.  No long track signs to indicate proximal cord compression. Plan/Treatment:   1. He is started in physical therapy. Because he is homebound this will be done at his apartment to revise symptomatic relief of his back pain as well as improve his strength and ambulatory ability. 2. I do not recommend any operative intervention including no removal of his spinal cord stimulator at this time. 3. if he needs further attention I would recommend he see Dr. Julio Duong in  the future. Gina Stallings MD  1/12/2022    This dictation was done with [employer] dictation and may contain mechanical errors related to translation.

## 2022-01-12 NOTE — PATIENT INSTRUCTIONS
Impression:   1. severe multilevel degenerative disc disease with associated degenerative scoliosis. 2.  Associated chronic lower thoracic and lumbar pain. 3.  No evidence of radiculopathy on physical examination. 4.  No long track signs to indicate proximal cord compression. Plan/Treatment:   1. He is started in physical therapy. Because he is homebound this will be done at his apartment to revise symptomatic relief of his back pain as well as improve his strength and ambulatory ability. 2. I do not recommend any operative intervention including no removal of his spinal cord stimulator at this time. 3. if he needs further attention I would recommend he see Dr. Cece Pena in  the future.       Ulysses Silva MD  1/12/2022

## 2022-01-14 NOTE — TELEPHONE ENCOUNTER
I called Chepe Wills and told him that Dr Darryl Garrison will sign forms.  He will fax them today and I told him Dr Darryl Garrison will be back at the [facility] on Tuesday

## 2022-01-19 NOTE — TELEPHONE ENCOUNTER
I spoke with patient's wife and she said that the therapist told her she needs to call the Ins about the therapy because they can only come 4 times and this will be free after that she has to pay $400.oo and they can not afford that. She was told to call the ins and check on that. She did and the Ins told her that we had to send an order for approval.  I called [facility] and spoke with Kaya Ashby the manager and she said it must be a confusion. Patient was scheduled for 7 visits already. The cost is $400.oo but they do not charge the patient, what they do is they request for more and if the Ins do not cover then they have to discharge the patient. The patient did not have a co-pay and they do not have to pay anything. I spoke with wife again and gave her the message. I told her that if her  did not get better she needs to call Dr Patrizia Stephen for further treatment. She understood.

## 2022-01-19 NOTE — TELEPHONE ENCOUNTER
General Question     Subject: PATIENT'S WIFE WOULD LIKE TO KNOW IF DR. DUARTE CAN WRITE AN ORDER TO THE INSURANCE COMPANY FOR HOME HEALTH CARE FOR PATIENT. SHE STATES THEY CANNOT AFFORD THE EXPENSE OF THE THERAPY. PLEASE ADVISE.     Patient:  Jaiden Robertson Number: [phone]

## 2022-02-27 PROBLEM — R62.7 FAILURE TO THRIVE IN ADULT: Status: ACTIVE | Noted: 2022-01-01

## 2022-02-27 NOTE — H&P
Hospital Medicine History & Physical      PCP: Raudel Vaughn MD    Date of Admission: 2/27/2022    Date of Service: Pt seen/examined on 2/27/22 and Admitted to Inpatient with expected LOS greater than two midnights     Chief Complaint:  Back pain       History Of Present Illness: 80 y.o. male who presented to [facility] fell at home lives with wife. Has chronic back pain per ER note collapsed didn't hit head . Couldn't get up paramedics called required cardoso insert for urine retention. Uses a walker at home     Past Medical History:          Diagnosis Date    Anxiety     Arthritis     Bradycardia     Chronic back pain     DJD (degenerative joint disease)     DM (diabetes mellitus screen)     borderline     Heart disease     High blood pressure     Hyperlipidemia     IBS (irritable bowel syndrome)     Mixed hyperlipidemia     Urinary retention due to benign prostatic hyperplasia 3/8/2016       Past Surgical History:          Procedure Laterality Date    ARM SURGERY Left 9/27/2019    WIDE LOCAL EXCISION LEFT UPPER ARM LESION performed by Jairo Harding MD at  [address]  may 2007    CARDIAC SURGERY      by pass    CATARACT REMOVAL      COLONOSCOPY      CORONARY ARTERY BYPASS GRAFT      FOOT SURGERY      LUMBAR LAMINECTOMY      SPINE SURGERY         Medications Prior to Admission:      Prior to Admission medications    Medication Sig Start Date End Date Taking? Authorizing Provider   vitamin C (ASCORBIC ACID) 500 MG tablet Take 1,000 mg by mouth daily    Historical Provider, MD   HYDROcodone-acetaminophen (NORCO) 5-325 MG per tablet Take 1 tablet by mouth 2 times daily as needed for Pain. Historical Provider, MD   ALPRAZolam Vester Mocha) 0.25 MG tablet Take 0.25 mg by mouth 2 times daily as needed for Sleep.     Historical Provider, MD   amLODIPine (NORVASC) 5 MG tablet TAKE 1 TABLET EVERY DAY (NEED MD APPOINTMENT FOR REFILLS) 1/24/22   Raudel Vaughn MD   blood glucose test strips (TRUE METRIX BLOOD GLUCOSE TEST) strip TEST BLOOD SUGAR EVERY DAY AND AS NEEDED FOR SYMPTOMS OF IRREGULAR BLOOD GLUCOSE 1/24/22   Yvonne Garcia MD   Blood Glucose Monitoring Suppl (TRUE METRIX METER) w/Device KIT USE AS DIRECTED 1/21/22   Yvonne Garcia MD   cloNIDine (CATAPRES) 0.1 MG tablet TAKE 1 TABLET TWICE DAILY 12/16/21   Yvonne Garcia MD   potassium chloride (KLOR-CON) 10 MEQ extended release tablet TAKE 1 TABLET TWICE DAILY 12/16/21   Yvonne Garcia MD   tamsulosin [facility]) 0.4 MG capsule TAKE 1 CAPSULE EVERY DAY 12/2/21   Yvonne Garcia MD   Alcohol Swabs (B-D SINGLE USE SWABS REGULAR) PADS One to two times daily 11/22/21   Yvonne Garcia MD   mirtazapine (REMERON) 15 MG tablet TAKE 2 TABLETS EVERY NIGHT 11/22/21   Yvonne Garcia MD   TRUEplus Lancets 33G MISC Check blood sugar daily 11/22/21   Yvonne Garcia MD   Multiple Vitamins-Minerals (THERAPEUTIC MULTIVITAMIN-MINERALS) tablet Take 1 tablet by mouth daily    Historical Provider, MD       Allergies:  Benadryl [diphenhydramine hcl], Cymbalta [duloxetine hcl], Metoprolol, Tramadol, and Amoxicillin    Social History:      The patient currently lives with wife    TOBACCO:   reports that he quit smoking about 55 years ago. His smoking use included cigarettes. He has never used smokeless tobacco.  ETOH:   reports no history of alcohol use. E-Cigarettes/Vaping Use     Questions Responses    E-Cigarette/Vaping Use Never User    Start Date     Passive Exposure     Quit Date     Counseling Given Yes    Comments             Family History:            Problem Relation Age of Onset    Other Mother         cva   [place]ost[place]ui Cancer Father         lung       REVIEW OF SYSTEMS COMPLETED:   Pertinent positives as noted in the HPI. All other systems reviewed and negative.     PHYSICAL EXAM PERFORMED:    /65   Pulse 58   Temp 97.8 °F (36.6 °C) (Oral)   Resp 16   SpO2 95%     General appearance: no distress was trying to use cellphone [place] HEENT:  Normocephalic, atraumatic without obvious deformity. Pupils equal, round, and reactive to light. Extra ocular muscles intact. Conjunctivae/corneas clear. mucosa moist   Neck: Supple, with full range of motion. No jugular venous distention. Trachea midline. Respiratory:  Normal respiratory effort. Clear to auscultation,   Cardiovascular:  Regular rate and rhythm  S1/S2syst murmur left 2nd ICS no rubs or gallops. Abdomen: Soft, non-tender, non-distended with normal bowel sounds. Musculoskeletal:  No clubbing, cyanosis or edema bilaterally  Skin: Skin color, texture, turgor normal.  No rash hyperpigmented patch left LE  Neurologic:  Cranial nerves: II-XII intact, grossly non-focal.  Psychiatric:  Alert poor insight oriented knows at hospital thinks the year is \"7\"    Peripheral Pulses: +2 palpable, equal bilaterally       Labs:     Recent Labs     02/27/22  1056   WBC 4.7   HGB 12.3*   HCT 37.3*   *     Recent Labs     02/27/22  1056      K 4.4      CO2 23   BUN 20   CREATININE 1.1   CALCIUM 9.4     No results for input(s): AST, ALT, BILIDIR, BILITOT, ALKPHOS in the last 72 hours. No results for input(s): INR in the last 72 hours. Recent Labs     02/27/22  1056   TROPONINI 0.01       Urinalysis:      Lab Results   Component Value Date    NITRU Negative 01/11/2019    WBCUA 4 03/13/2016    BACTERIA 4+ 03/13/2016    RBCUA 14 03/13/2016    BLOODU Negative 01/11/2019    SPECGRAV 1.015 01/11/2019    GLUCOSEU Negative 01/11/2019    GLUCOSEU NEGATIVE 04/10/2012       Radiology:       No orders to display       ASSESSMENT:    There are no active hospital problems to display for this patient.     Acute on chronic back pain   Acute urine retention   Fall  FTT  -DM type 2  Dementia  HTN    pallaitive care to see, per outpt PCP note was on hospice taken off and has deteriorated  --PT OT to see  No imaging of back was done in ER reporetedly no obvious injury   Continue home meds clonidine, amlodipine   Mirtazapine HS  On flomax but had urine retention required cardoso insert       DVT Prophylaxis: lovenox  Diet: No diet orders on file  Code Status: Prior    PT/OT Eval Status: walker prior     Dispo - lives w wife       Rashawn Kate MD    Thank you Drew Trammell MD for the opportunity to be involved in this patient's care. If you have any questions or concerns please feel free to contact me at [phone].

## 2022-02-27 NOTE — ED PROVIDER NOTES
[facility]      Pt Name: Verena Perry  MRN: [id number]  [place] 10/28/1927  Date of evaluation: 2/27/2022  Provider: Letha Faria PA-C    This patient was not seen and evaluated by the attending physician Karl Wilcox MD.    CHIEF COMPLAINT      Chief Complaint: Fall      HISTORYOF PRESENT ILLNESS  (Location/Symptom, Timing/Onset, Context/Setting, Quality, Duration, Modifying Factors, Severity.)   Verena Perry is a 80 y.o. male who presents to the emergency department after a fall. The patient lives at home with his wife. He tells me that he has chronic back pain and it was worse this morning and he \"collapsed\". He did not hit his head and did not lose consciousness. He was unable to get himself up and paramedics brought him in. He says at this time his back pain is gone. He is unsure why he fell. He denies any other complaints other than some urinary frequency. Nursing Notes were reviewed and I agree. REVIEW OF SYSTEMS    (2-9 systems for level 4, 10 or more forlevel 5)     Review of Systems   Constitutional: Negative for chills and fever. HENT: Negative for sore throat. Respiratory: Negative for shortness of breath. Cardiovascular: Negative for chest pain. Gastrointestinal: Negative for abdominal pain, nausea and vomiting. Genitourinary: Positive for frequency. Negative for difficulty urinating and dysuria. Musculoskeletal: Positive for back pain. Skin: Negative for rash. Neurological: Positive for weakness (\"collapsed\"). Negative for light-headedness and headaches. Psychiatric/Behavioral: The patient is not nervous/anxious. All other systems reviewed and are negative. Positives and Pertinent negatives as per HPI. Except as noted above the remainder of the review of systems was reviewed and negative.        PAST MEDICALHISTORY         Diagnosis Date    Anxiety     Arthritis     Bradycardia     Chronic back pain     DJD (degenerative joint disease)     DM (diabetes mellitus screen)     borderline     Heart disease     High blood pressure     Hyperlipidemia     IBS (irritable bowel syndrome)     Mixed hyperlipidemia     Urinary retention due to benign prostatic hyperplasia 3/8/2016       SURGICAL HISTORY           Procedure Laterality Date    ARM SURGERY Left 9/27/2019    WIDE LOCAL EXCISION LEFT UPPER ARM LESION performed by Becca Croft MD at [facility]  may 2007   [address]      by pass    CATARACT REMOVAL      COLONOSCOPY      CORONARY ARTERY BYPASS GRAFT      FOOT SURGERY      LUMBAR LAMINECTOMY      SPINE SURGERY         CURRENT MEDICATIONS       Previous Medications    ALCOHOL SWABS (B-D SINGLE USE SWABS REGULAR) PADS    One to two times daily    ALPRAZOLAM (XANAX) 0.25 MG TABLET    Take 0.25 mg by mouth 2 times daily as needed for Sleep. AMLODIPINE (NORVASC) 5 MG TABLET    TAKE 1 TABLET EVERY DAY (NEED MD APPOINTMENT FOR REFILLS)    BLOOD GLUCOSE MONITORING SUPPL (TRUE METRIX METER) W/DEVICE KIT    USE AS DIRECTED    BLOOD GLUCOSE TEST STRIPS (TRUE METRIX BLOOD GLUCOSE TEST) STRIP    TEST BLOOD SUGAR EVERY DAY AND AS NEEDED FOR SYMPTOMS OF IRREGULAR BLOOD GLUCOSE    CLONIDINE (CATAPRES) 0.1 MG TABLET    TAKE 1 TABLET TWICE DAILY    HYDROCODONE-ACETAMINOPHEN (NORCO) 5-325 MG PER TABLET    Take 1 tablet by mouth 2 times daily as needed for Pain.      MIRTAZAPINE (REMERON) 15 MG TABLET    TAKE 2 TABLETS EVERY NIGHT    MULTIPLE VITAMINS-MINERALS (THERAPEUTIC MULTIVITAMIN-MINERALS) TABLET    Take 1 tablet by mouth daily    POTASSIUM CHLORIDE (KLOR-CON) 10 MEQ EXTENDED RELEASE TABLET    TAKE 1 TABLET TWICE DAILY    TAMSULOSIN (FLOMAX) 0.4 MG CAPSULE    TAKE 1 CAPSULE EVERY DAY    TRUEPLUS LANCETS 33G MISC    Check blood sugar daily    VITAMIN C (ASCORBIC ACID) 500 MG TABLET    Take 1,000 mg by mouth daily       ALLERGIES     Benadryl [diphenhydramine hcl], Cymbalta [duloxetine hcl], Metoprolol, Tramadol, and Amoxicillin    FAMILY HISTORY           Problem Relation Age of Onset    Other Mother         cva    Cancer Father         lung     Family Status   Relation Name Status    Mother      Father          SOCIAL HISTORY    reports that he quit smoking about 55 years ago. His smoking use included cigarettes. He has never used smokeless tobacco. He reports that he does not drink alcohol and does not use drugs. PHYSICAL EXAM    (up to 7 for level 4, 8 or more for level 5)     ED Triage Vitals [22 0815]   BP Temp Temp Source Pulse Resp SpO2 Height Weight   (!) 139/48 97.8 °F (36.6 °C) Oral 72 16 97 % -- --       Physical Exam  Vitals and nursing note reviewed. Constitutional:       General: He is not in acute distress. Appearance: He is well-developed. HENT:      Head: Normocephalic and atraumatic. Eyes:      Pupils: Pupils are equal, round, and reactive to light. Cardiovascular:      Rate and Rhythm: Normal rate and regular rhythm. Heart sounds: Normal heart sounds. Pulmonary:      Effort: Pulmonary effort is normal. No respiratory distress. Breath sounds: Normal breath sounds. Musculoskeletal:         General: No tenderness. Normal range of motion. Cervical back: Neck supple. Skin:     General: Skin is warm and dry. Neurological:      General: No focal deficit present. Mental Status: He is alert and oriented to person, place, and time. Psychiatric:         Behavior: Behavior normal.            DIAGNOSTIC RESULTS     When ordered, EKGs are interpreted by the Emergency Department Physician in the absence of a cardiologist. Please see their note for interpretation of EKG    EK-lead EKG shows NSR. No ST elevation or depression on my review. Please see Dr. Yudith Celaya note for full interpretation.       LABS:  Labs Reviewed   CBC WITH AUTO DIFFERENTIAL - Abnormal; Notable for the following components:       Result Value    RBC 3.85 (*)     Hemoglobin 12.3 (*)     Hematocrit 37.3 (*)     Platelets 689 (*)     Lymphocytes Absolute 0.7 (*)     All other components within normal limits    Narrative:     Performed at:  [address] [place]    Phone [phone]   BASIC METABOLIC PANEL W/ REFLEX TO MG FOR LOW K - Abnormal; Notable for the following components:    Glucose 105 (*)     All other components within normal limits    Narrative:     Performed at:  [address] [place]    Phone [phone]   TROPONIN    Narrative:     Performed at:  [address]    Phone [phone] [facility]       When ordered, only abnormal lab results are displayed. All other labs are within normal range or not returned as of the dictation. EMERGENCY DEPARTMENT COURSE and DIFFERENTIAL DIAGNOSIS/MDM:   Vitals:    Vitals:    02/27/22 1000 02/27/22 1015 02/27/22 1030 02/27/22 1045   BP: 122/61 128/60 124/62 126/65   Pulse:   63 58   Resp:       Temp:       TempSrc:       SpO2: 97% 96% 95% 95%        I have evaluated this patient. My supervising physician was available for consultation. Patient is nontoxic and afebrile. He presents after a fall from generalized weakness. He has no back pain on my exam and no tenderness. I had the nurse ambulate the patient and he was very weak, he was unable to ambulate without significant assistance from the nurse and had to brace on objects in the room. The patient tells me this is new. Given this I am going to recommend that he be admitted to the hospital for further inpatient management. His work-up was significant for urinary retention only as the patient was unable to provide us a sample and had greater than 400 cc on bladder scan.   I have asked the nurse to place a Irvin catheter. An EKG and troponin were performed, troponin was 0.01. Electrolytes, H&H, renal function normal.  The hospitalist was contacted via perfect serve for admission for further inpatient management and he will likely require placement for rehab following admission    PROCEDURES:  None    FINAL IMPRESSION      1. Fall, initial encounter    2. Generalized weakness    3. Unable to ambulate    4. Urinary retention    5. Chronic upper back pain          DISPOSITION/PLAN   DISPOSITION Admitted 02/27/2022 01:17:36 PM      PATIENT REFERRED TO:  No follow-up provider specified.     MEDICATIONS:  New Prescriptions    No medications on file       (Please note that portions of this note were completed with a voice recognition program.  Efforts were made toedit the dictations but occasionally words are mis-transcribed.)    MATTHEW Rajan PA-C  02/27/22 1697

## 2022-02-27 NOTE — ED NOTES
Bladder scan shows 467 ml of fluid in the patient's bladder. Notified Florence Mario RN  02/27/22 8763

## 2022-02-27 NOTE — ED PROVIDER NOTES
Pt Name: Jane Bailey  MRN: [id number]  [place] 10/28/1927  Date of evaluation: 2/27/2022    EKG Interpretation    The purpose of this note is for preliminary EKG interpretation only. This patient was seen independently by the mid-level provider and was not seen by this provider. EKG visualized preliminarily interpreted by myself shows sinus rhythm at a rate of 71. There is a first-degree AV block. The axis is -11. Some tremor and electrical interference affects interpretation but overall no acute pathology.   Other intervals are normal.        Vernon Whitney MD  02/27/22 1549

## 2022-02-27 NOTE — ED NOTES
Ambulated patient per order. Pt gait short and weak. Pt required assistance from nurse and bracing on objets in the room.      Rafael Segundo RN  02/27/22 4688

## 2022-02-27 NOTE — ED NOTES
Could not obtain weight. Pt unable to stand. Bed miscalibrated and reading 0 kg.      Nir Kumar RN  02/27/22 0194

## 2022-02-27 NOTE — ED NOTES
Patient resting on stretcher. Patient updated on his diagnosis because he asked Liya Wilian is wrong with me\". Called patient's wife for him. Provided the patient with a warm blanked.       Macario Zhou RN  02/27/22 5855

## 2022-02-28 PROBLEM — E44.1 MILD MALNUTRITION (HCC): Chronic | Status: ACTIVE | Noted: 2022-01-01

## 2022-02-28 NOTE — PLAN OF CARE
Problem: Falls - Risk of:  Goal: Will remain free from falls  Description: Will remain free from falls  Outcome: Ongoing  Goal: Absence of physical injury  Description: Absence of physical injury  Outcome: Ongoing     Problem: Skin Integrity:  Goal: Will show no infection signs and symptoms  Description: Will show no infection signs and symptoms  Outcome: Ongoing  Goal: Absence of new skin breakdown  Description: Absence of new skin breakdown  Outcome: Ongoing     Problem: Pain:  Goal: Pain level will decrease  Description: Pain level will decrease  Outcome: Ongoing  Goal: Control of acute pain  Description: Control of acute pain  Outcome: Ongoing  Goal: Control of chronic pain  Description: Control of chronic pain  Outcome: Ongoing  Goal: Patient's pain/discomfort is manageable  Description: Patient's pain/discomfort is manageable  Outcome: Ongoing     Problem: Infection:  Goal: Will remain free from infection  Description: Will remain free from infection  Outcome: Ongoing     Problem: Safety:  Goal: Free from accidental physical injury  Description: Free from accidental physical injury  Outcome: Ongoing  Goal: Free from intentional harm  Description: Free from intentional harm  Outcome: Ongoing     Problem: Daily Care:  Goal: Daily care needs are met  Description: Daily care needs are met  Outcome: Ongoing     Problem: Skin Integrity:  Goal: Skin integrity will stabilize  Description: Skin integrity will stabilize  Outcome: Ongoing     Problem: Discharge Planning:  Goal: Patients continuum of care needs are met  Description: Patients continuum of care needs are met  Outcome: Ongoing     Problem: Non-Violent Restraints  Goal: Removal from restraints as soon as assessed to be safe  Outcome: Ongoing  Goal: No harm/injury to patient while restraints in use  Outcome: Ongoing  Goal: Patient's dignity will be maintained  Outcome: Ongoing     Problem: Nutrition  Goal: Optimal nutrition therapy  2/28/2022 9155 by [place] Estella Rios RN  Outcome: Ongoing  2/28/2022 1138 by Corona Holden  Outcome: Ongoing

## 2022-02-28 NOTE — PROGRESS NOTES
Physical Therapy  Initial Evaluation Attempt    22    Name: Danielle Arnold   : 10/28/1927    MRN: [id number]    PT order noted. Nsg requests to hold at this time due pt agitation. Will continue to follow as time permits.     Electronically signed by Tj Pearce PT on 2022 at 9:18 AM

## 2022-02-28 NOTE — PROGRESS NOTES
Physical Therapy  Initial Evaluation Attempt    22    Name: Herman Hammans   : 10/28/1927    MRN: [id number]    PT order noted. Pt in restraints. Patient family requesting to re-establish hospice care. Will await final decision prior to initiating therapy evaluation.     Electronically signed by Linda Padilla PT on 2022 at 2:37 PM

## 2022-02-28 NOTE — CONSULTS
[address] pre family request to re-establish with them; spoke with Clary Grijalva ([phone]) they will see at [facility] to see if he qualified. She requested being called at [facility]. Meet with wife & daughter & gave them a hospice list in case [facility] doesn't feel he meets criteria for hospice care.     Sudhakar Vivas RN, BSN,   [phone]  Electronically signed by Sudhakar Vivas RN on 2/28/2022 at 12:08 PM

## 2022-02-28 NOTE — PLAN OF CARE
Nutrition Problem #1: Inadequate protein intake  Intervention: Food and/or Nutrient Delivery: Continue Current Diet,Start Oral Nutrition Supplement  Nutritional Goals: meal and supplement intakes >50% this admission      Problem: Nutrition  Goal: Optimal nutrition therapy  Outcome: Ongoing

## 2022-02-28 NOTE — ACP (ADVANCE CARE PLANNING)
Advance Care Planning     Advance Care Planning Activator (Inpatient)  Conversation Note      Date of ACP Conversation: 2/28/2022     Conversation Conducted with: Patient with Decision Making Capacity    ACP Activator: Shayy Rees 45 Decision Maker:     Current Designated Health Care Decision Maker:     Primary Decision Maker:  [facility] - [phone]    Primary Decision Maker: [facility] - [phone]    Primary Decision Maker: Shalini Loza [facility] - [phone]    Today we documented Decision Maker(s) consistent with Legal Next of Kin hierarchy. Care Preferences    Ventilation: \"If you were in your present state of health and suddenly became very ill and were unable to breathe on your own, what would your preference be about the use of a ventilator (breathing machine) if it were available to you? \"      Would the patient desire the use of ventilator (breathing machine)?: no    \"If your health worsens and it becomes clear that your chance of recovery is unlikely, what would your preference be about the use of a ventilator (breathing machine) if it were available to you? \"     Would the patient desire the use of ventilator (breathing machine)?: No      Resuscitation  \"CPR works best to restart the heart when there is a sudden event, like a heart attack, in someone who is otherwise healthy. Unfortunately, CPR does not typically restart the heart for people who have serious health conditions or who are very sick. \"    \"In the event your heart stopped as a result of an underlying serious health condition, would you want attempts to be made to restart your heart (answer \"yes\" for attempt to resuscitate) or would you prefer a natural death (answer \"no\" for do not attempt to resuscitate)? \" no       [] Yes   [] No   Educated Patient / [employer] regarding differences between Advance Directives and portable DNR orders.     Length of ACP Conversation in minutes:  5

## 2022-02-28 NOTE — CONSULTS
PALLIATIVE MEDICINE CONSULTATION     Patient name:Jose Desai   RWN:[id number]    :10/28/1927  Room/Bed:O2I-3244/4269-01   LOS: 1 day         Date of consult:2022    Consult Information    Inpatient consult to Palliative Care  Consult performed by: JAIME Manuel - CNP  Consult ordered by: Adarsh Betancourt MD           ASSESSMENT/RECOMMENDATIONS     80 y.o. male with AMS and back pain. Symptom Management:  1. AMS: Patient was confused and disoriented x 4 during my visit today. Patient did not appear to be decisional or oriented to his situation. 2. Back Pain: Patient taking Norco PRN for relief. 3. Goals of Care: Met patient, his spouse and primary decision maker Andres and his daughter Lynda Mitchell at the bedside today. Patient appeared to be disoriented x 4 and not decisional today. Reviewed patient's current health status, goals of care and code status with family present today at bedside. Silver Bowen indicated the patient has declined (much more confused) since last being under [facility]. Silver Bowen indicated the patient's family would like to again pursue hospice services with [facility] at this time. Hospice consult placed. Patient's RN and  notified. Code status was reviewed and the patient is to be a DNR-CC at this time. Patient/Family Goals of Care :     - Met patient, his spouse and primary decision maker Andres and his daughter Lynda Mitchell at the bedside today. Patient appeared to be disoriented x 4 and not decisional today. Reviewed patient's current health status, goals of care and code status with family present today at bedside. Silver Bowen indicated the patient has declined (much more confused) since last being under [facility]. Silver Bowen indicated the patient's family would like to again pursue hospice services with [facility] at this time. Hospice consult placed.   Patient's RN and  notified. Code status was reviewed and the patient is to be a DNR-CC at this time. Disposition/Discharge Plan:   Hospice consult placed. Advance Directives:  · Surrogate Decision Maker: France Webster, patient's spouse. Angelique Ayala, patient's daughter assists with decision making as well. · Code status:  DNR-CC    Case discussed with: patient, floor RN, Andres (spouse), Antoinettemayco Jamie (daughter),   Thank you for allowing us to participate in the care of this patient. HISTORY     CC: AMS. HPI: The patient is a 80 y.o. male with a past medical history of anxiety, chronic back pain, DJD, DM, heart disease, hyperlipidemia, HTN, dementia and IBS who presented to [facility] after having a fall at home. Patient was admitted with Fall, generalized weakness and chronic upper back pain. Palliative Medicine SymptomScreening/ROS:    Review of Systems   Unable to perform ROS: Mental status change   All other systems reviewed and are negative. Patient unable to complete full ROS due to current cognitive status. Information that is obtained from nursing and chart. Palliative Performance Scale:     [] 60%  Amb reduced; Sig dz. Can't do hobbies/housework; Intake normal or reduced, Occasional assist; LOC full/confusion   [] 50%  Mainly sit/lie; Extensive disease. Mainly assist, Intake normal or reduced; Occasional assist; LOC full/confusion   [x] 40%  Mainly in bed; Extensive disease; Mainly assist; Intake normal or reduced; Occasional assist; LOC full/confusion   [] 30%  Bed bound, Extensive disease; Total care; Intake reduced; LOC full/confusion   [] 20%  Bed bound; Extensive disease; Total care; Intake minimal; Drowsy/coma   [] 10%  Bed bound; Extensive disease;  Total care; Mouth care only; Drowsy/coma   []  0%   Death       Home med list and hospital medications reviewed in chart as of 2/28/2022     EXAM     Vitals:    02/28/22 1427   BP: (!) 125/53   Pulse: 94   Resp: 20   Temp: SpO2: 96%       Physical Exam  Vitals reviewed. Constitutional:       Appearance: He is ill-appearing. HENT:      Head: Normocephalic and atraumatic. Nose: Nose normal.   Eyes:      Extraocular Movements: Extraocular movements intact. Pupils: Pupils are equal, round, and reactive to light. Cardiovascular:      Rate and Rhythm: Normal rate. Pulses: Normal pulses. Pulmonary:      Effort: Pulmonary effort is normal. No respiratory distress. Breath sounds: Normal breath sounds. Abdominal:      General: Bowel sounds are normal. There is no distension. Palpations: Abdomen is soft. Tenderness: There is no guarding. Musculoskeletal:         General: Tenderness (back) present. Cervical back: Neck supple. Right lower leg: No edema. Left lower leg: No edema. Skin:     General: Skin is warm and dry. Coloration: Skin is pale. Neurological:      Comments: Patient appeared to be disoriented x 4 during my visit. Psychiatric:      Comments:  Confused and agitated today.           Current labs in the epic chart reviewed as of 2/28/2022   Review of previous notes, admits, labs, radiology and testing relevant to this consult done in this chart today 2/28/2022      Total time: 87 minutes  >50% of time spent counseling patient at bedside or POA/family member if applicable , reviewing information and discussing care, coordinating with care team  Signed By: Electronically signed by JAIME Alejandra CNP on 2/28/2022 at 2:39 PM  Palliative Medicine   [phone]    February 28, 2022

## 2022-02-28 NOTE — PROGRESS NOTES
Comprehensive Nutrition Assessment   Completed by intern: Nona Leslie    Type and Reason for Visit:  Initial, Consult (Oral Nutrition Supplements)    Nutrition Recommendations/Plan:   Continue current diet   Trial Glucerna TID    Nutrition Assessment:  Consult to order ONS. Pt with a primary dx of failure to thrive. Pt is not eating meal trays and was irritatable upon visit. Family was sceen in room and we discussed ordering ONS TID to increase calories and protein intakes. Pt with a PMH of DM, dementia, HTN, and chronic back pain. Will trial Glucerna and monitor tolerance. Malnutrition Assessment:  Malnutrition Status:  Mild malnutrition    Context:  Acute Illness     Findings of the 6 clinical characteristics of malnutrition:  Energy Intake:  1 - 75% or less of estimated energy requirements for 7 or more days  Weight Loss:  No significant weight loss     Body Fat Loss:  1 - Mild body fat loss Orbital,Buccal region   Muscle Mass Loss:  Unable to assess    Fluid Accumulation:  No significant fluid accumulation     Strength:  Not Performed    Estimated Daily Nutrient Needs:  Energy (kcal):  9303-5616 (25-30 kcal/kg); Weight Used for Energy Requirements:  Current     Protein (g):  66-80 (1-1.2 g/kg); Weight Used for Protein Requirements:  Current        Fluid (ml/day):   ; Method Used for Fluid Requirements:  1 ml/kcal      Nutrition Related Findings:  LBM 2/27, left lower extremity edema, labs reviewed. Wounds:  None       Current Nutrition Therapies:    ADULT DIET; Regular; 4 carb choices (60 gm/meal)    Anthropometric Measures:  · Height: 5' 9\" (175.3 cm)  · Current Body Weight: 145 lb (65.8 kg)   · Admission Body Weight: 144 lb (65.3 kg)    · Usual Body Weight:  (unknown)     · Ideal Body Weight: 160 lbs; % Ideal Body Weight     · BMI: 21.4  · BMI Categories: Normal Weight (BMI 18.5-24. 9)       Nutrition Diagnosis:   · Inadequate protein intake related to inadequate protein-energy intake as evidenced

## 2022-02-28 NOTE — PROGRESS NOTES
Occupational Therapy  Genoveva Constantino    OT order noted. Pt in restraints. Patient family requesting to re-establish hospice care. Will await final decision prior to initiating therapy evaluation.     Electronically signed by Fanny Richard OT on 2/28/22 at 2:58 PM EST

## 2022-02-28 NOTE — CARE COORDINATION
Received call from Felix Martin in [address], family would like re-establish with [facility] who he had been active with in the past.    Call made to [address] left my number for a return call. Savannah Greenberg RN, BSN,   [phone]  Electronically signed by Savannah Greenberg RN on 2/28/2022 at 11:15 AM     Received a call from Nabeel Fitzpatrick ([phone], hospice nurse with [facility]; she would like a call closer to dc to let us know what they will need; they can admit after pt dc back to home.     Savannah Greenberg RN, BSN,   [phone]  Electronically signed by Savannah Greenberg RN on 2/28/2022 at 11:23 AM

## 2022-02-28 NOTE — PROGRESS NOTES
Hospitalist Progress Note  2/28/2022 11:50 AM    PCP: Yon Dover MD    [id number]     Date of Admission: 2/27/2022                                                                                                                     HOSPITAL COURSE    Patient demographics:  The patient  Karol Ritchie is a 80 y.o. male     Significant past medical history:   Patient Active Problem List   Diagnosis    Chronic pain syndrome    Spinal stenosis, lumbar region, without neurogenic claudication    Coronary atherosclerosis    Essential hypertension    Mixed hyperlipidemia    Anxiety state    Type 2 [facility] mellitus without complication [facility] [address]    Dementia ([facility] [address]    Benign essential tremor    Postlaminectomy syndrome, lumbar region    Arthropathy of lumbar facet joint    Vascular dementia with behavior disturbance (HCC)    Bradycardia    Squamous cell carcinoma of skin of left upper arm    Sensorineural hearing loss, bilateral    Sinoatrial node dysfunction (HCC)    Localized cancer of skin of ear    History of colonic polyps    Generalized osteoarthrosis, unspecified site    Esophageal reflux    Failure to thrive in adult    Mild malnutrition (HCC)         Presenting symptoms:  Back pain     Diagnostic workup:      CONSULTS DURING ADMISSION :   IP CONSULT TO HOSPITALIST  IP CONSULT TO PALLIATIVE CARE  IP CONSULT TO DIETITIAN  IP CONSULT TO CASE MANAGEMENT  IP CONSULT TO HOSPICE      Patient was diagnosed with:        Treatment while inpatient:  80years old male who presented to the emergency room after a fall at home. Irvin's catheter was placed for urinary retention.                                                                              ----------------------------------------------------------      SUBJECTIVE COMPLAINTS- follow up for Back pain     Diet: ADULT DIET;  Regular; 4 carb choices (60 gm/meal)      OBJECTIVE:   Patient Active Problem List   Diagnosis    Chronic pain syndrome    Spinal stenosis, lumbar region, without neurogenic claudication    Coronary atherosclerosis    Essential hypertension    Mixed hyperlipidemia    Anxiety state    Type 2 diabetes mellitus without complication (HCC)    Dementia (HCC)    Benign essential tremor    Postlaminectomy syndrome, lumbar region    Arthropathy of lumbar facet joint    Vascular dementia with behavior disturbance (HCC)    Bradycardia    Squamous cell carcinoma of skin of left upper arm    Sensorineural hearing loss, bilateral    Sinoatrial node dysfunction (HCC)    Localized cancer of skin of ear    History of colonic polyps    Generalized osteoarthrosis, unspecified site    Esophageal reflux    Failure to thrive in adult    Mild malnutrition (HCC)       Allergies  Benadryl [diphenhydramine hcl], Cymbalta [duloxetine hcl], Metoprolol, Tramadol, and Amoxicillin    Medications    Scheduled Meds:   OLANZapine  5 mg Oral TID    amLODIPine  5 mg Oral Daily    cloNIDine  0.1 mg Oral BID    insulin lispro  0-6 Units SubCUTAneous TID WC    insulin lispro  0-3 Units SubCUTAneous Nightly    mirtazapine  30 mg Oral Nightly    tamsulosin  0.4 mg Oral Daily    sodium chloride flush  5-40 mL IntraVENous 2 times per day    enoxaparin  40 mg SubCUTAneous Daily    famotidine  20 mg Oral Daily     Continuous Infusions:   dextrose      sodium chloride       PRN Meds:  ALPRAZolam, glucose, dextrose, glucagon (rDNA), dextrose, HYDROcodone-acetaminophen, sodium chloride flush, sodium chloride, ondansetron **OR** ondansetron, acetaminophen **OR** acetaminophen, bisacodyl    Vitals   Vitals /wt   Patient Vitals for the past 8 hrs:   Resp SpO2   02/28/22 0759 16 94 %        72HR INTAKE/OUTPUT:      Intake/Output Summary (Last 24 hours) at 2/28/2022 1150  Last data filed at 2/28/2022 0643  Gross per 24 hour   Intake 100 ml   Output 1975 ml   Net -1875 ml       Exam:    Gen:   Alert and oriented ×3   Eyes: PERRL.  No sclera icterus. No conjunctival injection. ENT: No discharge. Pharynx clear. External appearance of ears and nose normal.  Neck: Trachea midline. No obvious mass. Resp: No accessory muscle use. No crackles. No wheezes. No rhonchi. CV: Regular rate. Regular rhythm. No murmur or rub. No edema. GI: Non-tender. Non-distended. No hernia. Skin: Warm, dry, normal texture and turgor. Lymph: No cervical LAD. No supraclavicular LAD. M/S: / Ext. No cyanosis. No clubbing. No joint deformity. Neuro: CN 2-12 are intact,  no neurologic deficits noted. PT/INR: No results for input(s): PROTIME, INR in the last 72 hours. APTT: No results for input(s): APTT in the last 72 hours. CBC:   Recent Labs     02/27/22  1056 02/28/22  0743   WBC 4.7 5.6   HGB 12.3* 12.5*   HCT 37.3* 37.6*   MCV 96.8 95.7   * 135       BMP:   Recent Labs     02/27/22  1056 02/28/22  0743    135*   K 4.4 3.9    103   CO2 23 20*   BUN 20 17   CREATININE 1.1 1.1       LIVER PROFILE:   Recent Labs     02/28/22  0743   ALKPHOS 84   AST 16   ALT 6*   BILITOT 0.5     No results for input(s): AMYLASE in the last 72 hours. No results for input(s): LIPASE in the last 72 hours. UA:No results for input(s): NITRITE, LABCAST, WBCUA, RBCUA, MUCUS in the last 72 hours. TROPONIN:   Recent Labs     02/27/22  1056   TROPONINI 0.01       Lab Results   Component Value Date/Time    URRFLXCULT Not Indicated 02/27/2022 10:56 AM       No results for input(s): TSHREFLEX in the last 72 hours. No components found for: [id number]  POC GLUCOSE:    Recent Labs     02/27/22  1635 02/27/22  2033 02/28/22  0742   POCGLU 109* 151* 100*     Recent Labs     02/27/22  1703   LABA1C 5.5      Lab Results   Component Value Date    LABA1C 5.5 02/27/2022      Normal left ventricle size, wall thickness, and systolic function with an   estimated ejection fraction of 60-65%. (4/6/2018)    ASSESSMENT AND PLAN    Acute on chronic back pain   Acute urine retention Fall  FTT  -DM type 2  Dementia  HTN    Dementia with behavioral change  Zyprexa 5 mg every 6 as needed       [facility]  --PT OT to see   no obvious injury   Continue home meds clonidine, amlodipine   Mirtazapine HS  On flomax but had urine retention required cardoso insert          Code Status: DNR-CC        Dispo -cc        The patient and / or the family were informed of the results of any tests, a time was given to answer questions, a plan was proposed and they agreed with plan. Irene Rogers MD    This note was transcribed using [id number] [employer]. Please disregard any translational errors.

## 2022-02-28 NOTE — PROGRESS NOTES
Occupational Therapy  Latesha Langford    Attempted to see pt for OT evaluation. RN requests to hold therapy at this time d/t pt agitation. Will re-attempt as schedule allows.     Electronically signed by Becky Gilbert OT on 2/28/22 at 9:18 AM EST

## 2022-02-28 NOTE — PROGRESS NOTES
Pt attempting to get out of bed. When attempting to redirect him to get in bed Pt began to yell, spit and swing at staff in room. Pt requests that he wants wife. Call placed to wife.

## 2022-02-28 NOTE — PROGRESS NOTES
4 Eyes Skin Assessment     NAME:  Edgar Lara  YOB: 1927  MEDICAL RECORD NUMBER:  [id number]    The patient is being assess for  Admission    I agree that 2 RN's have performed a thorough Head to Toe Skin Assessment on the patient. ALL assessment sites listed below have been assessed. Areas assessed by both nurses:    Head, Face, Ears, Shoulders, Back, Chest, Arms, Elbows, Hands and Sacrum. Buttock, Coccyx, Ischium        Does the Patient have a Wound?  No noted wound(s)       Karl Prevention initiated:  Yes   Wound Care Orders initiated:  NA    Pressure Injury (Stage 3,4, Unstageable, DTI, NWPT, and Complex wounds) if present place consult order under [de-identified] No    New and Established Ostomies if present place consult order under : No      Nurse 1 eSignature: Electronically signed by Enoc Hernandez RN on 2/27/22 at 7:17 PM EST    **SHARE this note so that the co-signing nurse is able to place an eSignature**    Nurse 2 eSignature: Electronically signed by Prakash Garcia RN on 2/27/22 at 7:32 PM EST

## 2022-02-28 NOTE — CARE COORDINATION
INITIAL CASE MANAGEMENT ASSESSMENT    Reviewed chart, met with patient's wife & daughter to assess possible discharge needs. Explained Case Management role/services. Living Situation: verified address, lives in a condo on the 3th floor with elevator access    ADLs: independent with dressing, wife assists with sponge bath     DME: rollator, grab bars & shower chair in the bathroom     Active Services: history with [facility]     Transportation: will need transport home (stretcher d/t dementia)     Medications: no barriers, uses [facility] for chronic meds & [facility] in [place] for immediate needs    PCP: Yariel Thompson MD    PLAN/COMMENTS: wanting to re-establish with hospice, will be ready to take him home once back pain is controlled, will need transport home    CM provided contact information for patient or family to call with any questions. CM will follow and assist as needed.     Damaris Smalls RN, BSN,   [phone]    Electronically signed by Damaris Smalls RN on 2/28/2022 at 12:11 PM

## 2022-02-28 NOTE — PROGRESS NOTES
Patient arrives to room 4269 via stretcher. Patient is alert and oriented x 4. Respirations easy and even. Denies pain at this time. VS stable. Daughter and son in law at bedside. Call light in reach. Bed alarm active for forgetfulness.

## 2022-02-28 NOTE — PROGRESS NOTES
Patient trying to get up several time, patient confused. Patient stated each time \"I want to go home\". Patient was easily redirected. Tele camera pending. Will continue to monitor.

## 2022-03-01 NOTE — PROGRESS NOTES
Hospitalist Progress Note  3/1/2022 10:01 AM    PCP: Yvonne Garcia MD    [id number]     Date of Admission: 2/27/2022                                                                                                                     HOSPITAL COURSE    Patient demographics:  The patient  Verna Amaya is a 80 y.o. male     Significant past medical history:   Patient Active Problem List   Diagnosis    Chronic pain syndrome    Spinal stenosis, lumbar region, without neurogenic claudication    Coronary atherosclerosis    Essential hypertension    Mixed hyperlipidemia    Anxiety state    Type 2 [facility] mellitus without complication [facility] [address]    Dementia ([facility] [address]    Benign essential tremor    Postlaminectomy syndrome, lumbar region    Arthropathy of lumbar facet joint    Vascular dementia with behavior disturbance (HCC)    Bradycardia    Squamous cell carcinoma of skin of left upper arm    Sensorineural hearing loss, bilateral    Sinoatrial node dysfunction (HCC)    Localized cancer of skin of ear    History of colonic polyps    Generalized osteoarthrosis, unspecified site    Esophageal reflux    Failure to thrive in adult    Mild malnutrition (HCC)         Presenting symptoms:  Back pain     Diagnostic workup:      CONSULTS DURING ADMISSION :   IP CONSULT TO HOSPITALIST  IP CONSULT TO PALLIATIVE CARE  IP CONSULT TO DIETITIAN  IP CONSULT TO CASE MANAGEMENT  IP CONSULT TO HOSPICE      Patient was diagnosed with:        Treatment while inpatient:  80years old male who presented to the emergency room after a fall at home. Irvin's catheter was placed for urinary retention.                                                                              ----------------------------------------------------------      SUBJECTIVE COMPLAINTS- follow up for Back pain     Diet: ADULT DIET;  Regular; 4 carb choices (60 gm/meal)      OBJECTIVE:   Patient Active Problem List   Diagnosis    Chronic pain syndrome    Spinal stenosis, lumbar region, without neurogenic claudication    Coronary atherosclerosis    Essential hypertension    Mixed hyperlipidemia    Anxiety state    Type 2 diabetes mellitus without complication (HCC)    Dementia (HCC)    Benign essential tremor    Postlaminectomy syndrome, lumbar region    Arthropathy of lumbar facet joint    Vascular dementia with behavior disturbance (HCC)    Bradycardia    Squamous cell carcinoma of skin of left upper arm    Sensorineural hearing loss, bilateral    Sinoatrial node dysfunction (HCC)    Localized cancer of skin of ear    History of colonic polyps    Generalized osteoarthrosis, unspecified site    Esophageal reflux    Failure to thrive in adult    Mild malnutrition (HCC)       Allergies  Benadryl [diphenhydramine hcl], Cymbalta [duloxetine hcl], Metoprolol, Tramadol, and Amoxicillin    Medications    Scheduled Meds:   OLANZapine  5 mg Oral TID    amLODIPine  5 mg Oral Daily    cloNIDine  0.1 mg Oral BID    insulin lispro  0-6 Units SubCUTAneous TID WC    insulin lispro  0-3 Units SubCUTAneous Nightly    mirtazapine  30 mg Oral Nightly    tamsulosin  0.4 mg Oral Daily    sodium chloride flush  5-40 mL IntraVENous 2 times per day    enoxaparin  40 mg SubCUTAneous Daily    famotidine  20 mg Oral Daily     Continuous Infusions:   dextrose      sodium chloride       PRN Meds:  ALPRAZolam, glucose, dextrose, glucagon (rDNA), dextrose, HYDROcodone-acetaminophen, sodium chloride flush, sodium chloride, ondansetron **OR** ondansetron, acetaminophen **OR** acetaminophen, bisacodyl    Vitals   Vitals /wt   Patient Vitals for the past 8 hrs:   BP Temp Temp src Pulse Resp SpO2 Weight   03/01/22 0946 135/71 97.5 °F (36.4 °C) Oral 90 15 96 % --   03/01/22 0624 (!) 154/71 99 °F (37.2 °C) Oral 93 16 95 % --   03/01/22 0621 -- -- -- -- -- -- 145 lb 8.1 oz (66 kg)        72HR INTAKE/OUTPUT:      Intake/Output Summary (Last 24 hours) at 3/1/2022 1001  Last data filed at 3/1/2022 4213  Gross per 24 hour   Intake 240 ml   Output 1000 ml   Net -760 ml       Exam:    Gen:   Alert and oriented ×3   Eyes: PERRL. No sclera icterus. No conjunctival injection. ENT: No discharge. Pharynx clear. External appearance of ears and nose normal.  Neck: Trachea midline. No obvious mass. Resp: No accessory muscle use. No crackles. No wheezes. No rhonchi. CV: Regular rate. Regular rhythm. No murmur or rub. No edema. GI: Non-tender. Non-distended. No hernia. Skin: Warm, dry, normal texture and turgor. Lymph: No cervical LAD. No supraclavicular LAD. M/S: / Ext. No cyanosis. No clubbing. No joint deformity. Neuro: CN 2-12 are intact,  no neurologic deficits noted. PT/INR: No results for input(s): PROTIME, INR in the last 72 hours. APTT: No results for input(s): APTT in the last 72 hours. CBC:   Recent Labs     02/27/22  1056 02/28/22  0743 03/01/22  0631   WBC 4.7 5.6 8.1   HGB 12.3* 12.5* 12.8*   HCT 37.3* 37.6* 38.4*   MCV 96.8 95.7 96.2   * 135 134*       BMP:   Recent Labs     02/27/22  1056 02/28/22  0743 03/01/22  0631    135* 140   K 4.4 3.9 3.8    103 103   CO2 23 20* 19*   BUN 20 17 19   CREATININE 1.1 1.1 1.1       LIVER PROFILE:   Recent Labs     02/28/22  0743   ALKPHOS 84   AST 16   ALT 6*   BILITOT 0.5     No results for input(s): AMYLASE in the last 72 hours. No results for input(s): LIPASE in the last 72 hours. UA:No results for input(s): NITRITE, LABCAST, WBCUA, RBCUA, MUCUS in the last 72 hours. TROPONIN:   Recent Labs     02/27/22  1056   TROPONINI 0.01       Lab Results   Component Value Date/Time    URRFLXCULT Not Indicated 02/27/2022 10:56 AM       No results for input(s): TSHREFLEX in the last 72 hours.     No components found for: [id number]  POC GLUCOSE:    Recent Labs     02/28/22  0742 02/28/22  1157 02/28/22  1709 02/28/22  2102 03/01/22  0744   POCGLU 100* 130* 151* 125* 124*     Recent Labs 02/27/22  1703   LABA1C 5.5      Lab Results   Component Value Date    LABA1C 5.5 02/27/2022      Normal left ventricle size, wall thickness, and systolic function with an   estimated ejection fraction of 60-65%. (4/6/2018)    ASSESSMENT AND PLAN    Acute on chronic back pain       Pain management  R52  Continue with the IV and oral pain medication      Acute urine retention   Fall  FTT  -DM type 2  Dementia  HTN  Continue home medication amlodipine and clonidine    Dementia with behavioral change  Zyprexa 5 mg every 6 as needed       [facility] is [facility]  Family request to reestablish hospice care     Continue home meds clonidine, amlodipine     Mirtazapine HS    On flomax but had urine retention required cardoso insert          Code Status: DNR-CC        Dispo -consult to [facility]        The patient and / or the family were informed of the results of any tests, a time was given to answer questions, a plan was proposed and they agreed with plan. Sushma Olvera MD    This note was transcribed using [id number] [employer]. Please disregard any translational errors.

## 2022-03-01 NOTE — PROGRESS NOTES
Physician Progress Note      Katherin Ray  CSN #:                  [id number]  :                       10/28/1927  ADMIT DATE:       2022 8:11 AM  DISCH DATE:  RESPONDING  PROVIDER #:        Tiana Rolon MD          QUERY TEXT:    Pt admitted with back pain. Noted documentation of Mild Malnutrition by   ordered Dietary consultant. If possible, please document in progress notes   and discharge summary:    The medical record reflects the following:  Risk Factors: age, failure to thrive, dementia  Clinical Indicators: Documentation per Dietary of meets criteria for Mild   Malnutrition in context of acute illness. Energy Intake:  1 - 75% or less of   estimated energy requirements for 7 or more days. Body Fat Loss:  1 - Mild   body fat loss Orbital,Buccal region . Inadequate protein intake related to   inadequate protein-energy intake as evidenced by intake 0-25%  Treatment: Dietary consult, Glucerna TID, monitor Nutrition Focused Physical   Findings,Skin,Weight    Thank You, Blaire Garvey RN CDS CRCR  [email or web address]  Options provided:  -- Mild Malnutrition confirmed present on admission  -- Other - I will add my own diagnosis  -- Disagree - Not applicable / Not valid  -- Disagree - Clinically unable to determine / Unknown  -- Refer to Clinical Documentation Reviewer    PROVIDER RESPONSE TEXT:    The diagnosis of Mild Malnutrition was confirmed as present on admission. Query created by:  Work, Dewain Holter on 3/1/2022 10:29 AM      Electronically signed by:  Tiana Rolon MD 3/1/2022 11:50 AM

## 2022-03-01 NOTE — PLAN OF CARE
Problem: Falls - Risk of:  Goal: Will remain free from falls  Description: Will remain free from falls  Outcome: Ongoing  Goal: Absence of physical injury  Description: Absence of physical injury  Outcome: Ongoing     Problem: Skin Integrity:  Goal: Will show no infection signs and symptoms  Description: Will show no infection signs and symptoms  Outcome: Ongoing  Goal: Absence of new skin breakdown  Description: Absence of new skin breakdown  Outcome: Ongoing     Problem: Pain:  Goal: Pain level will decrease  Description: Pain level will decrease  Outcome: Ongoing  Goal: Control of acute pain  Description: Control of acute pain  Outcome: Ongoing  Goal: Control of chronic pain  Description: Control of chronic pain  Outcome: Ongoing  Goal: Patient's pain/discomfort is manageable  Description: Patient's pain/discomfort is manageable  Outcome: Ongoing     Problem: Infection:  Goal: Will remain free from infection  Description: Will remain free from infection  Outcome: Ongoing     Problem: Safety:  Goal: Free from accidental physical injury  Description: Free from accidental physical injury  Outcome: Ongoing  Goal: Free from intentional harm  Description: Free from intentional harm  Outcome: Ongoing     Problem: Daily Care:  Goal: Daily care needs are met  Description: Daily care needs are met  Outcome: Ongoing     Problem: Skin Integrity:  Goal: Skin integrity will stabilize  Description: Skin integrity will stabilize  Outcome: Ongoing     Problem: Discharge Planning:  Goal: Patients continuum of care needs are met  Description: Patients continuum of care needs are met  Outcome: Ongoing     Problem: Non-Violent Restraints  Goal: Removal from restraints as soon as assessed to be safe  Outcome: Ongoing  Goal: No harm/injury to patient while restraints in use  Outcome: Ongoing  Goal: Patient's dignity will be maintained  Outcome: Ongoing     Problem: Nutrition  Goal: Optimal nutrition therapy  Outcome: Ongoing

## 2022-03-01 NOTE — PROGRESS NOTES
Pt pulled out IV. MD aware said to leave it out.  Electronically signed by Jason Cota RN on 2/28/2022 at 7:12 PM

## 2022-03-01 NOTE — CARE COORDINATION
Talked with daughter Avril Lin & verified they want to go with [facility] & want them to see Xavier Gorman here. Referral sent to [facility] & call made to Sally Melchor with [facility]; she will review.     Sudhakar Vivas, RN, BSN,   [phone]  Electronically signed by Sudhakar Vivas RN on 3/1/2022 at 2:11 PM

## 2022-03-01 NOTE — PROGRESS NOTES
Occupational Therapy  Salvatore Copeland    Discussed w/ SW -pt and family have chosen to pursue hospice services. Will sign off at this time.      Electronically signed by Jag Mayers OT on 3/1/22 at 11:16 AM EST

## 2022-03-01 NOTE — PROGRESS NOTES
Physical Therapy  Karyna Head  [id number]  Discussed w/ SW -pt and family have chosen to pursue hospice services. Will sign off at this time.    Electronically signed by Antonio Hahn PT on 3/1/2022 at 1:59 PM

## 2022-03-02 NOTE — DISCHARGE INSTR - COC
Continuity of Care Form    Patient Name: Bhargav Brooks   :  10/28/1927  MRN:  [id number]    Admit date:  2022  Discharge date:  3/2/2022    Code Status Order: DNR-CC   Advance Directives:      Admitting Physician:  Fer Kennedy MD  PCP: Ramone Gimenez MD    Discharging Nurse: [facility] Unit/Room#: G2T-4859/9367-18  Discharging Unit Phone Number: [phone]    Emergency Contact:   Extended Emergency Contact Information  Primary Emergency Contact: Andres Maddox  Address: [address]           [place], [address] [facility] [address] Phone: [phone]  Mobile Phone: [phone]  Relation: Spouse  Secondary Emergency Contact: [facility] [place] Phone: [phone]  Mobile Phone: [phone]  Relation: Child    Past Surgical History:  Past Surgical History:   Procedure Laterality Date    ARM SURGERY Left 2019    WIDE LOCAL EXCISION LEFT UPPER ARM LESION performed by Valentino Alvarado MD at [address] [facility]  may 2007    CARDIAC SURGERY      by pass    CATARACT REMOVAL      COLONOSCOPY      CORONARY ARTERY BYPASS GRAFT      FOOT SURGERY      LUMBAR LAMINECTOMY      SPINE SURGERY         Immunization History:   Immunization History   Administered Date(s) Administered    COVID-19, Pfizer Purple top, DILUTE for use, 12+ yrs, 30mcg/0.3mL dose 2021, 2021    Influenza 10/03/2011, 2012, 10/03/2013    Influenza A ([id number]) Vaccine PF IM 2009    Influenza Vaccine, unspecified formulation 2018    Influenza Virus Vaccine 2010, 10/27/2014, 2015    Influenza, High Dose (Fluzone 65 yrs and older) 2017    Influenza, Intradermal, Quadrivalent, Preservative Free 2016    Influenza, Quadv, adjuvanted, 65 yrs +, IM, PF (Fluad) 10/25/2021    Influenza, Triv, inactivated, subunit, adjuvanted, IM (Fluad 65 yrs and older) 2019    Pneumococcal Conjugate 13-valent Lucinda Ying) 08/13/2015    Pneumococcal Polysaccharide ([id number]) 11/01/2000    Td, unspecified formulation 10/24/2005    Zoster Live (Zostavax) 02/22/2013       Active Problems:  Patient Active Problem List   Diagnosis Code    Chronic pain syndrome G89.4    Spinal stenosis, lumbar region, without neurogenic claudication M48.061    Coronary atherosclerosis I25.10    Essential hypertension I10    Mixed hyperlipidemia E78.2    Anxiety state F41.1    Type 2 diabetes mellitus without complication ([facility]) E54.9    Dementia ([facility]) F03.90    Benign essential tremor G25.0    Postlaminectomy syndrome, lumbar region M96.1    Arthropathy of lumbar facet joint M47.816    Vascular dementia with behavior disturbance ([facility]) F01.51    Bradycardia R00.1    Squamous cell carcinoma of skin of left upper arm C44.629    Sensorineural hearing loss, bilateral H90.3    Sinoatrial node dysfunction ([facility]) I49.5    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    History of colonic polyps Z86.010    Generalized osteoarthrosis, unspecified site M15.9    Esophageal reflux K21.9    Failure to thrive in adult R62.7    Mild malnutrition ([facility]) E44.1       Isolation/Infection:   Isolation            No Isolation          Patient Infection Status       None to display            Nurse Assessment:  Last Vital Signs: /72   Pulse 91   Temp 98 °F (36.7 °C) (Axillary)   Resp 13   Ht 5' 9\" (1.753 m)   Wt 144 lb 13.5 oz (65.7 kg)   SpO2 95%   BMI 21.39 kg/m²     Last documented pain score (0-10 scale): Pain Level: 0  Last Weight:   Wt Readings from Last 1 Encounters:   03/02/22 144 lb 13.5 oz (65.7 kg)     Mental Status:  disoriented    IV Access:  - None    Nursing Mobility/ADLs:  Walking   Dependent  Transfer  Dependent  Bathing  Dependent  Dressing  Dependent  Toileting  Dependent  Feeding  Dependent  Med Admin  Dependent  Med Delivery   whole    Wound Care Documentation and Therapy:        Elimination:  Continence:    Bowel: No  Bladder: No  Urinary Catheter: Insertion Date: 2/27/2022    Colostomy/Ileostomy/Ileal Conduit: No       Date of Last BM: 2/28/2022      Intake/Output Summary (Last 24 hours) at 3/2/2022 1210  Last data filed at 3/2/2022 0919  Gross per 24 hour   Intake 180 ml   Output --   Net 180 ml     I/O last 3 completed shifts: In: 600 [P.O.:600]  Out: 500 [Urine:500]    Safety Concerns:     History of Falls (last 30 days), At Risk for Falls, and Aspiration Risk    Impairments/Disabilities:      Speech, Vision, and Hearing    Nutrition Therapy:  Current Nutrition Therapy:   - Oral Diet:  General    Routes of Feeding: Oral  Liquids: No Restrictions  Daily Fluid Restriction: no  Last Modified Barium Swallow with Video (Video Swallowing Test): not done    Treatments at the Time of Hospital Discharge:   Respiratory Treatments: none    Oxygen Therapy:  is not on home oxygen therapy.   Ventilator:    - No ventilator support    Rehab Therapies: none hospice  Weight Bearing Status/Restrictions: No weight bearing restirctions  Other Medical Equipment (for information only, NOT a DME order):  wheelchair  Other Treatments: ***    Patient's personal belongings (please select all that are sent with patient):  None    RN SIGNATURE:  Electronically signed by Mehdi Goodwin RN on 3/2/22 at 12:13 PM EST    CASE MANAGEMENT/SOCIAL WORK SECTION    Inpatient Status Date: ***    Readmission Risk Assessment Score:  Readmission Risk              Risk of Unplanned Readmission:  17           Discharging to Facility/ Agency   Name:   Address:  Phone:  Fax:    Dialysis Facility (if applicable)   Name:  Address:  Dialysis Schedule:  Phone:  Fax:    / signature: {Esignature:186395231}    PHYSICIAN SECTION    Prognosis: {Prognosis:8311324053}    Condition at Discharge: [address] [facility] Patient Condition:[id number]}    Rehab Potential (if transferring to Rehab): {Prognosis:4575055223}    Recommended Labs or Other Treatments After Discharge: ***    Physician Certification: I certify the above information and transfer of Jamia Cook  is necessary for the continuing treatment of the diagnosis listed and that he requires {Admit to Appropriate Level of Care:32783} for {GREATER/LESS:649182279} 30 days.      Update Admission H&P: {CHP DME Changes in GIDQQ:550279863}    PHYSICIAN SIGNATURE:  {Esignature:293005124}

## 2022-03-02 NOTE — PROGRESS NOTES
PALLIATIVE MEDICINE PROGRESS NOTE     Patient name:Jose Epstein    DBF:[id number] :10/28/1927  Room/Bed:D6W-0554/4269-01    LOS: 3 days        ASSESSMENT/RECOMMENDATIONS     80 y.o. male with AMS and back pain.        Symptom Management:  1. AMS: Patient was again confused and disoriented x 4 today. Patient continues to not appear to be decisional or oriented to his situation. 2. Back Pain: Patient continues to have Norco PRN for relief. 3. Goals of Care: Again met the patient at his bedside. Patient appeared to be disoriented x 4 and not decisional today. Phoned patient's primary decision maker and spouse Valeriania and reviewed patient's current health condition, verified goals and verified code status. Irmgard indicated the family has decided to proceed with hospice care with [facility] for the patient. Code status is to remain DNR-CC.         Patient/Family Goals of Care :    3/2 - Again met the patient at his bedside. Patient appeared to be disoriented x 4 and not decisional today. Phoned patient's primary decision maker and spouse Andres and reviewed patient's current health condition, verified goals and verified code status. Irmgjann indicated the family has decided to proceed with hospice care with [facility] for the patient. Code status is to remain DNR-CC.       - Met patient, his spouse and primary decision maker Valeriajann and his daughter Kiara Aly at the bedside today. Patient appeared to be disoriented x 4 and not decisional today. Reviewed patient's current health status, goals of care and code status with family present today at bedside. Shyla Hurtado indicated the patient has declined (much more confused) since last being under [facility]. Shyla Hurtado indicated the patient's family would like to again pursue hospice services with [facility] at this time. Hospice consult placed. Patient's RN and  notified.  Code status was reviewed and the patient is to be a DNR-CC at this time.      Disposition/Discharge Plan:   Hospice.      Advance Directives:  · Surrogate Decision Maker: Christophe Ware, patient's spouse. Nguyen Hogan, patient's daughter assists with decision making as well. · Code status:  DNR-CC     Case discussed with: patient, Christophe Ware (spouse)  Thank you for allowing us to participate in the care of this patient. SUBJECTIVE     Chief Complaint: AMS. Last 24 hours:   Patient continues to be disoriented x 4. I spoke with patient's primary decision maker and spouse Christophe Ware. Irmgard indicated the family has decided to pursue hospice services with [facility]. ROS:    Review of Systems   Unable to perform ROS: Mental status change   All other systems reviewed and are negative. Patient unable to complete full ROS due to current cognitive status. Information that is obtained from nursing and chart. OBJECTIVE   /74   Pulse 95   Temp 97.7 °F (36.5 °C) (Axillary)   Resp 14   Ht 5' 9\" (1.753 m)   Wt 144 lb 13.5 oz (65.7 kg)   SpO2 93%   BMI 21.39 kg/m²   I/O last 3 completed shifts: In: 600 [P.O.:600]  Out: 500 [Urine:500]  I/O this shift:  In: 120 [P.O.:120]  Out: 300 [Urine:300]      Physical Exam  Vitals reviewed. Constitutional:       Appearance: He is ill-appearing. HENT:      Head: Normocephalic. Nose: Nose normal.   Eyes:      Pupils: Pupils are equal, round, and reactive to light. Cardiovascular:      Rate and Rhythm: Normal rate. Pulses: Normal pulses. Pulmonary:      Effort: Pulmonary effort is normal.      Breath sounds: Normal breath sounds. No wheezing or rhonchi. Abdominal:      General: Bowel sounds are normal.      Palpations: Abdomen is soft. Musculoskeletal:         General: Tenderness (back) present. Cervical back: Neck supple. Right lower leg: Edema (trace) present. Left lower leg: Edema (trace) present.    Skin:     General: Skin is warm and dry.      Coloration: Skin is pale. Neurological:      Comments: Patient was again disoriented x 4 and not decisional during my visit.    Psychiatric:      Comments: Lethargic today and confused          Total time: 36 minutes  >50% of time spent counseling patient at bedside or POA/family member if applicable , reviewing information and discussing care, coordinating with care team       Signed By: Electronically signed by JAIME Salazar CNP on 3/2/2022 at 4:55 PM   Palliative Medicine   [phone]    March 2, 2022

## 2022-03-02 NOTE — PLAN OF CARE
Problem: Falls - Risk of:  Goal: Will remain free from falls  Description: Will remain free from falls  3/2/2022 1159 by Trent Raya RN  Outcome: Ongoing  3/1/2022 2312 by Lucrecia Street RN  Outcome: Ongoing  Goal: Absence of physical injury  Description: Absence of physical injury  3/2/2022 1159 by Trent Raya RN  Outcome: Ongoing  3/1/2022 2312 by Lucrecia Street RN  Outcome: Ongoing     Problem: Skin Integrity:  Goal: Will show no infection signs and symptoms  Description: Will show no infection signs and symptoms  3/2/2022 1159 by Trent Raya RN  Outcome: Ongoing  3/1/2022 2312 by Lucrecai Street RN  Outcome: Ongoing  Goal: Absence of new skin breakdown  Description: Absence of new skin breakdown  3/2/2022 1159 by Trent Raya RN  Outcome: Ongoing  3/1/2022 2312 by Lucrecia Street RN  Outcome: Ongoing     Problem: Pain:  Goal: Pain level will decrease  Description: Pain level will decrease  3/2/2022 1159 by Trent Raya RN  Outcome: Ongoing  3/1/2022 2312 by Lucrecia Street RN  Outcome: Ongoing  Goal: Control of acute pain  Description: Control of acute pain  3/2/2022 1159 by Trent Raya RN  Outcome: Ongoing  3/1/2022 2312 by Lucrecia Street RN  Outcome: Ongoing  Goal: Control of chronic pain  Description: Control of chronic pain  3/2/2022 1159 by Trent Raya RN  Outcome: Ongoing  3/1/2022 2312 by Lucrecia Street RN  Outcome: Ongoing  Goal: Patient's pain/discomfort is manageable  Description: Patient's pain/discomfort is manageable  3/2/2022 1159 by Trent Raya RN  Outcome: Ongoing  3/1/2022 2312 by Lucrecia Street RN  Outcome: Ongoing     Problem: Infection:  Goal: Will remain free from infection  Description: Will remain free from infection  3/2/2022 1159 by Trent Raya RN  Outcome: Ongoing  3/1/2022 2312 by Lucrecia Street RN  Outcome: Ongoing     Problem: Safety:  Goal: Free from accidental physical injury  Description: Free from accidental physical injury  3/2/2022 1159 by Trent Raya RN  Outcome: Ongoing  3/1/2022 2312 by Matt Sheth RN  Outcome: Ongoing  Goal: Free from intentional harm  Description: Free from intentional harm  3/2/2022 1159 by Bertina Litten, RN  Outcome: Ongoing  3/1/2022 2312 by Matt Sheth RN  Outcome: Ongoing     Problem: Daily Care:  Goal: Daily care needs are met  Description: Daily care needs are met  3/2/2022 1159 by Bertina Litten, RN  Outcome: Ongoing  3/1/2022 2312 by Matt Sheth RN  Outcome: Ongoing     Problem: Skin Integrity:  Goal: Skin integrity will stabilize  Description: Skin integrity will stabilize  3/2/2022 1159 by Bertina Litten, RN  Outcome: Ongoing  3/1/2022 2312 by Matt Sheth RN  Outcome: Ongoing     Problem: Discharge Planning:  Goal: Patients continuum of care needs are met  Description: Patients continuum of care needs are met  3/2/2022 1159 by Bertina Litten, RN  Outcome: Ongoing  3/1/2022 2312 by Matt Sheth RN  Outcome: Ongoing     Problem: Non-Violent Restraints  Goal: Removal from restraints as soon as assessed to be safe  3/2/2022 1159 by Bertina Litten, RN  Outcome: Ongoing  3/1/2022 2312 by Matt Sheth RN  Outcome: Ongoing  Goal: No harm/injury to patient while restraints in use  3/2/2022 1159 by Bertina Litten, RN  Outcome: Ongoing  3/1/2022 2312 by Matt Sheth RN  Outcome: Ongoing  Goal: Patient's dignity will be maintained  3/2/2022 1159 by Bertina Litten, RN  Outcome: Ongoing  3/1/2022 2312 by Matt Sheth RN  Outcome: Ongoing     Problem: Nutrition  Goal: Optimal nutrition therapy  3/2/2022 1159 by Bertina Litten, RN  Outcome: Ongoing  3/1/2022 2312 by Matt Sheth RN  Outcome: Ongoing

## 2022-03-02 NOTE — PLAN OF CARE
Problem: Falls - Risk of:  Goal: Will remain free from falls  Description: Will remain free from falls  3/1/2022 2312 by Sean Velazquez RN  Outcome: Ongoing  3/1/2022 1641 by Gay French RN  Outcome: Ongoing  Goal: Absence of physical injury  Description: Absence of physical injury  3/1/2022 2312 by Sean Velazquez RN  Outcome: Ongoing  3/1/2022 1641 by Gay French RN  Outcome: Ongoing     Problem: Skin Integrity:  Goal: Will show no infection signs and symptoms  Description: Will show no infection signs and symptoms  3/1/2022 2312 by Sean Velazquez RN  Outcome: Ongoing  3/1/2022 1641 by Gay French RN  Outcome: Ongoing  Goal: Absence of new skin breakdown  Description: Absence of new skin breakdown  3/1/2022 2312 by Sean Velazquez RN  Outcome: Ongoing  3/1/2022 1641 by Gay French RN  Outcome: Ongoing     Problem: Pain:  Goal: Pain level will decrease  Description: Pain level will decrease  3/1/2022 2312 by Sean Velazquez RN  Outcome: Ongoing  3/1/2022 1641 by Gay French RN  Outcome: Ongoing  Goal: Control of acute pain  Description: Control of acute pain  3/1/2022 2312 by Sean Velazquez RN  Outcome: Ongoing  3/1/2022 1641 by Gay French RN  Outcome: Ongoing  Goal: Control of chronic pain  Description: Control of chronic pain  3/1/2022 2312 by Sean Velazquez RN  Outcome: Ongoing  3/1/2022 1641 by Gay French RN  Outcome: Ongoing  Goal: Patient's pain/discomfort is manageable  Description: Patient's pain/discomfort is manageable  3/1/2022 2312 by Sean Velazquez RN  Outcome: Ongoing  3/1/2022 1641 by Gay French RN  Outcome: Ongoing     Problem: Infection:  Goal: Will remain free from infection  Description: Will remain free from infection  3/1/2022 2312 by Sean Velazquze RN  Outcome: Ongoing  3/1/2022 1641 by Gay French RN  Outcome: Ongoing     Problem: Safety:  Goal: Free from accidental physical injury  Description: Free from accidental physical injury  3/1/2022 2312 by Sean Velazquez, RN  Outcome: Ongoing  3/1/2022 1641 by Jose Key RN  Outcome: Ongoing  Goal: Free from intentional harm  Description: Free from intentional harm  3/1/2022 2312 by Vickey Farris RN  Outcome: Ongoing  3/1/2022 1641 by Jose Key RN  Outcome: Ongoing     Problem: Daily Care:  Goal: Daily care needs are met  Description: Daily care needs are met  3/1/2022 2312 by Vickey Farris RN  Outcome: Ongoing  3/1/2022 1641 by Jose Key RN  Outcome: Ongoing     Problem: Skin Integrity:  Goal: Skin integrity will stabilize  Description: Skin integrity will stabilize  3/1/2022 2312 by Vickey Farris RN  Outcome: Ongoing  3/1/2022 1641 by Jose Key RN  Outcome: Ongoing     Problem: Discharge Planning:  Goal: Patients continuum of care needs are met  Description: Patients continuum of care needs are met  3/1/2022 2312 by Vickey Farris RN  Outcome: Ongoing  3/1/2022 1641 by Jose Key RN  Outcome: Ongoing     Problem: Non-Violent Restraints  Goal: Removal from restraints as soon as assessed to be safe  3/1/2022 2312 by Vickey Farris RN  Outcome: Ongoing  3/1/2022 1641 by Jose Key RN  Outcome: Ongoing  Goal: No harm/injury to patient while restraints in use  3/1/2022 2312 by Vickey Farris RN  Outcome: Ongoing  3/1/2022 1641 by Jose Key RN  Outcome: Ongoing  Goal: Patient's dignity will be maintained  3/1/2022 2312 by Vickey Farris RN  Outcome: Ongoing  3/1/2022 1641 by Jose Key RN  Outcome: Ongoing     Problem: Nutrition  Goal: Optimal nutrition therapy  3/1/2022 2312 by Vickey Farris RN  Outcome: Ongoing  3/1/2022 1641 by Jose Key RN  Outcome: Ongoing

## 2022-03-02 NOTE — CARE COORDINATION
Received call from Atul Bennett with [facility] yesterday, once they can get papers signed, pt will be enrolled with [facility] to go home, dc today is the plan.     Chrissie Das RN, BSN,   [phone]  Electronically signed by Chrissie Das RN on 3/2/2022 at 8:08 AM

## 2022-03-02 NOTE — CARE COORDINATION
CASE MANAGEMENT DISCHARGE SUMMARY:    DISCHARGE DATE: 03/02/22    DISCHARGED TO: home with [facility] [address] [facility]: [facility]             TIME: 10 Lanny Pringle RN, BSN, Case Management  [phone]    Electronically signed by Lexis Rodriges RN on 3/2/2022 at 12:15 PM

## 2022-03-02 NOTE — PROGRESS NOTES
PT picked up by [facility]. Going home with [facility]. Irvin left in place per hospice request. Notified family of the departure.  Electronically signed by eDbra Mcnulty RN on 3/2/2022 at 4:27 PM

## 2022-03-05 NOTE — DISCHARGE SUMMARY
Hospital Medicine Discharge Summary      Patient ID: Brandon Goldman , [id number]     Patient's PCP: Magali Gregorio MD    Admit Date: 2/27/2022     Discharge Date: 3/2/2022      Admitting Physician: Kianna Moy MD    Discharge Physician: Desirae Salmeron MD     Discharge Diagnoses: Active Hospital Problems    Diagnosis Date Noted    Mild malnutrition ([address] [E44.1] 02/28/2022    Failure to thrive in adult [R62.7] 02/27/2022         The patient was seen and examined on the day of discharge and this discharge summary is in conjunction with any daily progress note from day of discharge.     HOSPITAL COURSE     Patient demographics:  The patient  Brandon Goldman is a 80 y.o. male      Significant past medical history:       Patient Active Problem List   Diagnosis    Chronic pain syndrome    Spinal stenosis, lumbar region, without neurogenic claudication    Coronary atherosclerosis    Essential hypertension    Mixed hyperlipidemia    Anxiety state    Type 2 diabetes mellitus without complication (HCC)    Dementia (HCC)    Benign essential tremor    Postlaminectomy syndrome, lumbar region    Arthropathy of lumbar facet joint    Vascular dementia with behavior disturbance (HCC)    Bradycardia    Squamous cell carcinoma of skin of left upper arm    Sensorineural hearing loss, bilateral    Sinoatrial node dysfunction (HCC)    Localized cancer of skin of ear    History of colonic polyps    Generalized osteoarthrosis, unspecified site    Esophageal reflux    Failure to thrive in adult    Mild malnutrition (HCC)            Presenting symptoms:  Back pain      Diagnostic workup:        CONSULTS DURING ADMISSION :   IP CONSULT TO HOSPITALIST  IP CONSULT TO PALLIATIVE CARE  IP CONSULT TO DIETITIAN  IP CONSULT TO CASE MANAGEMENT  IP CONSULT TO HOSPICE  IP CONSULT TO HOSPICE        Patient was diagnosed with:  General weakness  Failure to thrive  Dementia with behavioral changes  Malnutrition due to poor oral intake        Treatment while inpatient:  80years old male who presented to the emergency room after a fall at home. Irvin's catheter was placed for urinary retention. Patient has a history of chronic back pain and that has gotten worse. Patient also has history of dementia with behavioral changes and at times patient is aggressive and violent. Patient has been with hospice in the past.             In the hospital patient was again very agitated and verbally and physically abusive towards the staff. Patient was started on Zyprexa that improved his behavior to some extent but still patient was in [facility]. Patient's oral intake is poor and he is not a candidate for PEG tube placement.       Patient's wife again requested hospice services for which hospice [facility] was consulted and they will enroll patient once patient gets home.        Discharge Condition:  stable      Discharged to:  Home      Activity:   as tolerated:     Follow Up:  Patient will be followed by the hospice service.            Labs: For convenience and continuity at follow-up the following most recent labs are provided:      CBC:   Lab Results   Component Value Date    WBC 8.1 03/01/2022    HGB 12.8 03/01/2022    HCT 38.4 03/01/2022     03/01/2022       RENAL:   Lab Results   Component Value Date     03/01/2022    K 3.8 03/01/2022    K 3.9 02/28/2022     03/01/2022    CO2 19 03/01/2022    BUN 19 03/01/2022    CREATININE 1.1 03/01/2022           Discharge Medications:      Medication List      START taking these medications    LORazepam 2 MG/ML concentrated solution  Commonly known as: ATIVAN  Take 0.5 mLs by mouth every 4 hours as needed (restlessness) for up to 14 days. morphine sulfate 20 MG/ML concentrated oral solution  Take 0.25 mLs by mouth every 2 hours as needed for Pain for up to 10 days.      OLANZapine 5 MG tablet  Commonly known as: ZYPREXA  Take 1 tablet by mouth in the morning, at noon, and at bedtime        CONTINUE taking these medications    amLODIPine 5 MG tablet  Commonly known as: NORVASC  TAKE 1 TABLET EVERY DAY (NEED MD APPOINTMENT FOR REFILLS)     B-D SINGLE USE SWABS REGULAR Pads  One to two times daily     cloNIDine 0.1 MG tablet  Commonly known as: CATAPRES  TAKE 1 TABLET TWICE DAILY     HYDROcodone-acetaminophen 5-325 MG per tablet  Commonly known as: NORCO     mirtazapine 15 MG tablet  Commonly known as: REMERON  TAKE 2 TABLETS EVERY NIGHT     potassium chloride 10 MEQ extended release tablet  Commonly known as: KLOR-CON  TAKE 1 TABLET TWICE DAILY     tamsulosin 0.4 MG capsule  Commonly known as: FLOMAX  TAKE 1 CAPSULE EVERY DAY     therapeutic multivitamin-minerals tablet     True Metrix Blood Glucose Test strip  Generic drug: blood glucose test strips  TEST BLOOD SUGAR EVERY DAY AND AS NEEDED FOR SYMPTOMS OF IRREGULAR BLOOD GLUCOSE     True Metrix Meter w/Device Kit  USE AS DIRECTED     TRUEplus Lancets 33G Misc  Check blood sugar daily     vitamin C 500 MG tablet  Commonly known as: ASCORBIC ACID        STOP taking these medications    ALPRAZolam 0.25 MG tablet  Commonly known as: Leida Hamilton           Where to Get Your Medications      You can get these medications from any pharmacy    Bring a paper prescription for each of these medications  · LORazepam 2 MG/ML concentrated solution  · morphine sulfate 20 MG/ML concentrated oral solution  · OLANZapine 5 MG tablet            Time Spent on discharge is more than 30 min in the examination, evaluation, counseling and review of medications and discharge plan. Signed:  Terra Barriga MD   3/4/2022      Thank you John Jhaveri MD for the opportunity to be involved in this patient's care. If you have any questions or concerns please feel free to contact me at [phone]. This note was transcribed using [id number] [employer]. Please disregard any translational errors.

## (undated) DEVICE — MINOR SET UP PK

## (undated) DEVICE — SKIN AFFIX SURG ADHESIVE 72/CS 0.55ML: Brand: MEDLINE

## (undated) DEVICE — SUTURE PERMA-HAND SZ 2-0 L30IN NONABSORBABLE BLK L26MM SH K833H

## (undated) DEVICE — CHLORAPREP 26ML ORANGE

## (undated) DEVICE — ELECTRODE PT RET AD L9FT HI MOIST COND ADH HYDRGEL CORDED

## (undated) DEVICE — GLOVE ORANGE PI 7 1/2   MSG9075

## (undated) DEVICE — GLOVE ORANGE PI 7   MSG9070

## (undated) DEVICE — SOLUTION IV IRRIG POUR BRL 0.9% SODIUM CHL 2F7124

## (undated) DEVICE — SUTURE VCRL SZ 3-0 L27IN ABSRB UD L26MM SH 1/2 CIR J416H

## (undated) DEVICE — SUTURE VCRL SZ 4-0 L18IN ABSRB UD L19MM PS-2 3/8 CIR PRIM J496H

## (undated) DEVICE — KIT OR ROOM TURNOVER W/STRAP

## (undated) DEVICE — CANISTER, RIGID, 1200CC: Brand: MEDLINE INDUSTRIES, INC.

## (undated) DEVICE — SHEET,DRAPE,53X77,STERILE: Brand: MEDLINE

## (undated) DEVICE — COVER LT HNDL BLU PLAS

## (undated) DEVICE — ELECTROSURGICAL PENCIL BUTTON SWITCH NON COATED BLADE ELECTRODE 10 FT (3 M) CORD HOLSTER: Brand: MEGADYNE